# Patient Record
Sex: FEMALE | Race: WHITE | NOT HISPANIC OR LATINO | URBAN - METROPOLITAN AREA
[De-identification: names, ages, dates, MRNs, and addresses within clinical notes are randomized per-mention and may not be internally consistent; named-entity substitution may affect disease eponyms.]

---

## 2017-02-20 ENCOUNTER — AMBULATORY - HEALTHEAST (OUTPATIENT)
Dept: FAMILY MEDICINE | Facility: CLINIC | Age: 62
End: 2017-02-20

## 2017-02-20 ENCOUNTER — COMMUNICATION - HEALTHEAST (OUTPATIENT)
Dept: FAMILY MEDICINE | Facility: CLINIC | Age: 62
End: 2017-02-20

## 2017-02-20 DIAGNOSIS — Z00.00 HEALTH CARE MAINTENANCE: ICD-10-CM

## 2017-02-23 ENCOUNTER — COMMUNICATION - HEALTHEAST (OUTPATIENT)
Dept: FAMILY MEDICINE | Facility: CLINIC | Age: 62
End: 2017-02-23

## 2017-03-14 ENCOUNTER — COMMUNICATION - HEALTHEAST (OUTPATIENT)
Dept: FAMILY MEDICINE | Facility: CLINIC | Age: 62
End: 2017-03-14

## 2017-03-29 ENCOUNTER — COMMUNICATION - HEALTHEAST (OUTPATIENT)
Dept: FAMILY MEDICINE | Facility: CLINIC | Age: 62
End: 2017-03-29

## 2017-07-10 ENCOUNTER — COMMUNICATION - HEALTHEAST (OUTPATIENT)
Dept: FAMILY MEDICINE | Facility: CLINIC | Age: 62
End: 2017-07-10

## 2017-07-10 DIAGNOSIS — R06.00 DYSPNEA: ICD-10-CM

## 2017-09-26 ENCOUNTER — OFFICE VISIT - HEALTHEAST (OUTPATIENT)
Dept: FAMILY MEDICINE | Facility: CLINIC | Age: 62
End: 2017-09-26

## 2017-09-26 DIAGNOSIS — Z00.00 HEALTH CARE MAINTENANCE: ICD-10-CM

## 2017-09-26 DIAGNOSIS — I10 ESSENTIAL HYPERTENSION, BENIGN: ICD-10-CM

## 2017-09-26 DIAGNOSIS — Z12.11 SCREEN FOR COLON CANCER: ICD-10-CM

## 2017-09-26 LAB
CHOLEST SERPL-MCNC: 155 MG/DL
FASTING STATUS PATIENT QL REPORTED: YES
HDLC SERPL-MCNC: 64 MG/DL
LDLC SERPL CALC-MCNC: 76 MG/DL
TRIGL SERPL-MCNC: 75 MG/DL

## 2017-09-26 ASSESSMENT — MIFFLIN-ST. JEOR: SCORE: 1615.91

## 2017-10-20 ENCOUNTER — COMMUNICATION - HEALTHEAST (OUTPATIENT)
Dept: FAMILY MEDICINE | Facility: CLINIC | Age: 62
End: 2017-10-20

## 2018-03-13 ENCOUNTER — COMMUNICATION - HEALTHEAST (OUTPATIENT)
Dept: FAMILY MEDICINE | Facility: CLINIC | Age: 63
End: 2018-03-13

## 2018-03-13 DIAGNOSIS — R06.00 DYSPNEA: ICD-10-CM

## 2018-09-11 ENCOUNTER — COMMUNICATION - HEALTHEAST (OUTPATIENT)
Dept: FAMILY MEDICINE | Facility: CLINIC | Age: 63
End: 2018-09-11

## 2018-09-11 DIAGNOSIS — R06.00 DYSPNEA: ICD-10-CM

## 2018-09-11 DIAGNOSIS — I10 ESSENTIAL HYPERTENSION, BENIGN: ICD-10-CM

## 2019-05-06 ENCOUNTER — COMMUNICATION - HEALTHEAST (OUTPATIENT)
Dept: FAMILY MEDICINE | Facility: CLINIC | Age: 64
End: 2019-05-06

## 2019-05-06 DIAGNOSIS — R06.00 DYSPNEA: ICD-10-CM

## 2019-09-12 ENCOUNTER — COMMUNICATION - HEALTHEAST (OUTPATIENT)
Dept: FAMILY MEDICINE | Facility: CLINIC | Age: 64
End: 2019-09-12

## 2019-09-12 DIAGNOSIS — I10 ESSENTIAL HYPERTENSION, BENIGN: ICD-10-CM

## 2019-09-12 DIAGNOSIS — R06.00 DYSPNEA: ICD-10-CM

## 2020-02-06 ENCOUNTER — COMMUNICATION - HEALTHEAST (OUTPATIENT)
Dept: FAMILY MEDICINE | Facility: CLINIC | Age: 65
End: 2020-02-06

## 2020-02-06 DIAGNOSIS — R06.00 DYSPNEA: ICD-10-CM

## 2020-06-08 ENCOUNTER — COMMUNICATION - HEALTHEAST (OUTPATIENT)
Dept: FAMILY MEDICINE | Facility: CLINIC | Age: 65
End: 2020-06-08

## 2020-06-08 DIAGNOSIS — R06.00 DYSPNEA: ICD-10-CM

## 2020-08-30 ENCOUNTER — OFFICE VISIT - HEALTHEAST (OUTPATIENT)
Dept: FAMILY MEDICINE | Facility: CLINIC | Age: 65
End: 2020-08-30

## 2020-08-30 ENCOUNTER — COMMUNICATION - HEALTHEAST (OUTPATIENT)
Dept: SCHEDULING | Facility: CLINIC | Age: 65
End: 2020-08-30

## 2020-08-30 DIAGNOSIS — H57.12 LEFT EYE PAIN: ICD-10-CM

## 2020-08-30 DIAGNOSIS — R00.0 TACHYCARDIA: ICD-10-CM

## 2020-08-30 DIAGNOSIS — R51.9 LEFT-SIDED HEADACHE: ICD-10-CM

## 2020-08-30 DIAGNOSIS — I10 HYPERTENSION, UNSPECIFIED TYPE: ICD-10-CM

## 2020-09-02 ENCOUNTER — COMMUNICATION - HEALTHEAST (OUTPATIENT)
Dept: CARDIOLOGY | Facility: CLINIC | Age: 65
End: 2020-09-02

## 2020-09-03 ENCOUNTER — OFFICE VISIT - HEALTHEAST (OUTPATIENT)
Dept: CARDIOLOGY | Facility: CLINIC | Age: 65
End: 2020-09-03

## 2020-09-03 DIAGNOSIS — I48.91 NEW ONSET ATRIAL FIBRILLATION (H): ICD-10-CM

## 2020-09-03 DIAGNOSIS — I10 ESSENTIAL HYPERTENSION, BENIGN: ICD-10-CM

## 2020-09-03 DIAGNOSIS — E66.813 CLASS 3 SEVERE OBESITY DUE TO EXCESS CALORIES WITHOUT SERIOUS COMORBIDITY WITH BODY MASS INDEX (BMI) OF 40.0 TO 44.9 IN ADULT (H): ICD-10-CM

## 2020-09-03 DIAGNOSIS — E66.01 CLASS 3 SEVERE OBESITY DUE TO EXCESS CALORIES WITHOUT SERIOUS COMORBIDITY WITH BODY MASS INDEX (BMI) OF 40.0 TO 44.9 IN ADULT (H): ICD-10-CM

## 2020-09-03 DIAGNOSIS — Z72.0 TOBACCO ABUSE: ICD-10-CM

## 2020-09-03 DIAGNOSIS — I48.19 PERSISTENT ATRIAL FIBRILLATION (H): ICD-10-CM

## 2020-09-03 ASSESSMENT — MIFFLIN-ST. JEOR: SCORE: 1586.42

## 2020-09-04 ENCOUNTER — COMMUNICATION - HEALTHEAST (OUTPATIENT)
Dept: CARDIOLOGY | Facility: CLINIC | Age: 65
End: 2020-09-04

## 2020-09-08 ENCOUNTER — OFFICE VISIT (OUTPATIENT)
Dept: NEUROLOGY | Facility: CLINIC | Age: 65
End: 2020-09-08
Payer: MEDICARE

## 2020-09-08 ENCOUNTER — COMMUNICATION - HEALTHEAST (OUTPATIENT)
Dept: CARDIOLOGY | Facility: CLINIC | Age: 65
End: 2020-09-08

## 2020-09-08 VITALS — BODY MASS INDEX: 39.95 KG/M2 | WEIGHT: 234 LBS | HEIGHT: 64 IN

## 2020-09-08 DIAGNOSIS — I48.91 NEW ONSET ATRIAL FIBRILLATION (H): ICD-10-CM

## 2020-09-08 DIAGNOSIS — E66.01 MORBID OBESITY (H): ICD-10-CM

## 2020-09-08 DIAGNOSIS — G43.809 OTHER MIGRAINE WITHOUT STATUS MIGRAINOSUS, NOT INTRACTABLE: Primary | ICD-10-CM

## 2020-09-08 DIAGNOSIS — H53.2 DOUBLE VISION: ICD-10-CM

## 2020-09-08 DIAGNOSIS — R93.89 ABNORMAL MRI: ICD-10-CM

## 2020-09-08 PROCEDURE — 99205 OFFICE O/P NEW HI 60 MIN: CPT | Mod: 95 | Performed by: PSYCHIATRY & NEUROLOGY

## 2020-09-08 RX ORDER — ALBUTEROL SULFATE 90 UG/1
AEROSOL, METERED RESPIRATORY (INHALATION)
COMMUNITY
Start: 2020-06-08 | End: 2021-10-22

## 2020-09-08 RX ORDER — ASPIRIN 81 MG/1
TABLET ORAL
COMMUNITY

## 2020-09-08 RX ORDER — CETIRIZINE HYDROCHLORIDE 10 MG/1
10 TABLET ORAL
COMMUNITY

## 2020-09-08 RX ORDER — LISINOPRIL AND HYDROCHLOROTHIAZIDE 20; 25 MG/1; MG/1
TABLET ORAL
COMMUNITY
Start: 2019-09-13 | End: 2021-10-28

## 2020-09-08 ASSESSMENT — MIFFLIN-ST. JEOR: SCORE: 1591.42

## 2020-09-08 NOTE — NURSING NOTE
Chief Complaint   Patient presents with     Consult     HA- last HERRERA last Tues. Saw Md Guajardo- and with using Apixaban, Metotrololtartrate-Medication has been helpful and decrease in HA's     Computer visit: [email or web address]  [phone] if connection issues.    Brii Palacios, ATC

## 2020-09-08 NOTE — PROGRESS NOTES
"NEUROLOGY OUTPATIENT CONSULT NOTE (VIDEO)  Sep 8, 2020     CHIEF COMPLAINT/REASON FOR VISIT/REASON FOR CONSULT  Patient presents with:  Consult: HA- last HERRERA last Tues. Saw Md Guajardo- and with using Apixaban, Metotrololtartrate-Medication has been helpful and decrease in HA's    REASON FOR CONSULTATION- Headaches    REFERRAL SOURCE  Dr. Guajardo  CC Dr. Guajardo    Video Visit Consent  Patient is being evaluated via a billable video visit. The patient has been notified of following:   \"This video visit will be conducted via a call between you and your physician/provider. We have found that certain health care needs can be provided without the need for an in-person physical exam. This service lets us provide the care you need with a video conversation. If a prescription is necessary we can send it directly to your pharmacy. If lab work is needed we can place an order for that and you can then stop by our lab to have the test done at a later time.  If during the course of the call the physician/provider feels a video visit is not appropriate, you will not be charged for this service.  Physician has received verbal consent for a Video Visit from the patient? YES  Patient would like the video invitation sent by: Email/SMS    Video Visit Details  Type of service: Video Visit  Video Start Time: 10:30  Video End Time (time video stopped): 10:50  Originating Location (pt. Location): Patient's Home  Distant Location (provider location): [facility] Neurology [place]   Mode of Communication: Video Conference via [employer]    HISTORY OF PRESENT ILLNESS  Nicole Hopkins is a 65 year old female seen today for evaluation of headaches.  She reports that she gets headaches sporadically.  They are less than once a month.  On August 18 she started noticing a bump growing in the left temple.  This kept on growing.  On August 30 she had a visit to the ER.  She started having a headache.  Headache came on all of a sudden.  There was no " triggers.  She also had double vision in her left eye.  She is not sure if closing 1 eye made the double vision go away or not.  Her eyeball was sore when she moved it from side to side.  The headache was more of a throbbing headache in the forehead region.  There was some photophobia no phonophobia denies any auras.  Denies any nausea.  She was also found to be in hypertension and atrial fibrillation in the emergency room.  She was treated with apixaban, Lopressor and metoprolol.  Since then she has not had any headaches.  The double vision did not the whole day but then went away.  The lump on her left temple also has resolved since then.  She does have an appointment with dermatology and ENT for follow-up.  She did have an MRI of the brain which showed possible lymphoma versus granulomatous disease.  These were extra cranial.    Patient reports no other neurological symptoms.    Previous history is reviewed and this is unchanged.    PAST MEDICAL/SURGICAL HISTORY  Past Medical History:   Diagnosis Date     Hypertension      Patient Active Problem List   Diagnosis     Morbid obesity (H)   Reviewed and negative other than hypertension and atrial fibrillation    FAMILY HISTORY  Family History   Problem Relation Age of Onset     No Known Problems Mother      No Known Problems Father    No known family history of any neurological problems per the patient.  Reviewed and noncontributory    SOCIAL HISTORY  Social History     Tobacco Use     Smoking status: Current Every Day Smoker     Packs/day: 1.50     Years: 40.00     Pack years: 60.00     Types: Cigarettes     Smokeless tobacco: Never Used   Substance Use Topics     Alcohol use: Yes     Comment: 3-5 cans of beer a day     Drug use: Never       SYSTEMS REVIEW  Twelve-system ROS was done and other than the HPI this was negative.    MEDICATIONS  albuterol (PROAIR HFA/PROVENTIL HFA/VENTOLIN HFA) 108 (90 Base) MCG/ACT inhaler, INHALE 2 PUFFS BY MOUTH EVERY 6 HOURS AS  "NEEDED FOR WHEEZING  apixaban ANTICOAGULANT (ELIQUIS) 5 MG tablet, Take 5 mg by mouth  lisinopril-hydrochlorothiazide (ZESTORETIC) 20-25 MG tablet, TAKE 1 TABLET BY MOUTH ONCE DAILY  aspirin 81 MG EC tablet,   cetirizine (ZYRTEC) 10 MG tablet, Take 10 mg by mouth  miconazole (MICATIN) 100 MG vaginal suppository, Place 100 mg vaginally    No current facility-administered medications on file prior to visit.        PHYSICAL EXAMINATION  VITALS: Ht 1.626 m (5' 4\")   Wt 106.1 kg (234 lb)   BMI 40.17 kg/m    Exam was limited due to video encounter.    Vitals-Unable to do on video  GENERAL -Health appearing, No apparent distress  EYES- No scleral icterus, no eyelid droop, Pupils symmetric  HEENT - Normocephalic, atraumatic, Hearing grossly intact; Oral mucosa moist and pink in color. External Ears and nose intact.   Neck - soft/flexible with normal ROM on visual inspection.  PULM - Good spontaneous respiratory effort;  CV- No edema on visual inspection  MSK- Gait - see Neuro section; Strength and tone- see Neuro section; Range of motion grossly intact.  Psych- Normal mood and affect. Good judgment and insight.     Neurological  Mental status - Patient is awake and oriented. Attention span is normal. Language is fluent and follows commands appropriately.   Cranial nerves - Pupils are symmetric; EOMI, NLF symmetric  Motor - There is no pronator drift. Antigravity in all 4 ext.  Tone - No evidence of rigidity on visual inspection. No tremor.  Reflexes - Unable to do on video  Sensation - Unable to do on Video  Coordination - Finger to nose without dysmetria.   Gait and station - Romberg is negative. Gait is steady        DIAGNOSTICS  MRI  HEAD MRI:   1.  No acute infarction.  2.  Prominence of the nasopharyngeal soft tissues with cystic changes. Nasopharyngeal neoplasm as well as hypertrophy with cystic change/retention cysts are considerations. Correlate with direct inspection.  3.  There is plaque-like, abnormal " subcutaneous soft tissue in the left frontal scalp along the outer cortex of the left frontal bone that demonstrates avid postcontrast enhancement with mild surrounding stranding. Differential considerations include   enhancing granulation tissue if there is a history of prior trauma to this site, as well as neoplasm including primary skin malignancy and lymphoma. This abnormal soft tissue is quite superficial, immediately deep to the skin surface and would be easily   amenable to percutaneous sampling.     HEAD MRA:   1.  Normal MRA [place].     NECK MRA:  1.  Normal neck MRA.    CARDIOLOGY  ECHO pending    RELEVANT LABS  Component      Latest Ref Rng & Units 8/30/2020   WBC      4.0 - 11.0 thou/uL 8.4   RBC      3.80 - 5.40 mill/uL 4.77   Hemoglobin      12.0 - 16.0 g/dL 16.9 (H)   Hematocrit      35.0 - 47.0 % 47.9 (H)   MCV      80 - 100 fL 100   MCH      27.0 - 34.0 pg 35.4 (H)   MCHC      32.0 - 36.0 g/dL 35.3   RDW      11.0 - 14.5 % 12.7   Platelets      140 - 440 thou/uL 283   MPV      8.5 - 12.5 fL 9.7   Neutrophils %      50 - 70 % 77 (H)   Lymphocytes %      20 - 40 % 13 (L)   Monocytes %      2 - 10 % 9   Eosinophils %      0 - 6 % 1   Basophils %      0 - 2 % 1   Immature Granulocyte %      <=0 % 1 (H)   Neutrophils Absolute      2.0 - 7.7 thou/uL 6.5   Lymphocytes Absolute      0.8 - 4.4 thou/uL 1.1   Monocytes Absolute      0.0 - 0.9 thou/uL 0.8   Eosinophils Absolute      0.0 - 0.4 thou/uL 0.1   Basophils Absolute      0.0 - 0.2 thou/uL 0.0   Immature Granulocyte Absolute      <=0.0 thou/uL 0.0   Sodium      136 - 145 mmol/L 133 (L)   Potassium      3.5 - 5.0 mmol/L 3.9   Chloride      98 - 107 mmol/L 98   CO2      22 - 31 mmol/L 23   Anion Gap, Calculation      5 - 18 mmol/L 12   Glucose      70 - 125 mg/dL 115   Calcium      8.5 - 10.5 mg/dL 9.3   BUN      8 - 22 mg/dL 5 (L)   Creatinine      0.60 - 1.10 mg/dL 0.75   GFR MDRD Af Amer      >60 mL/min/1.73m2 >60   GFR MDRD Non Af Amer       >60 mL/min/1.73m2 >60   Troponin I      0.00 - 0.29 ng/mL <0.01   Sed Rate      0 - 20 mm/hr 4   CRP      0.0 - 0.8 mg/dL 1.9 (H)   TSH      0.30 - 5.00 uIU/mL 2.59       OUTSIDE RECORDS  Outside referral notes were reviewed and pertinent information has been summarized in the HPI.  Notes from Dr. Guajardo were reviewed.  In summary, patient presented the ER with double vision in the left eye along with a bad headache.  She was found to be in atrial fibrillation.  Was started on Eliquis as well as metoprolol.  On-call neurologist was consulted.  MRI brain was done which are negative for stroke.  Patient was discharged home.  Patient was supposed to follow-up with neurology, dermatology, ENT    IMPRESSION/REPORT/PLAN  Other migraine without status migrainosus, not intractable  Morbid obesity (H)  Double vision  Abnormal MRI  Atrial fibrillation    This is a 65 year old female with new onset of headache with migrainous features.  Given the MRI being negative I suspect this was a complicated migraine headache.  The double vision that she had is not unusual in people with migraines can get aura of double vision.  MRI is further negative for stroke.  She is already on Eliquis.  MRI was negative for any large vessel stenosis/occlusion.  Headaches have not reoccurred since she was last seen in the ER.  Would hold off on any preventive or abortive medication.  In terms of the lesion on the scalp this is of unclear significance.  Since it is extracranial I would have her follow-up with the ENT and dermatologist to see what the next steps would be.  I do not suspect that this caused her headache.  ESR were further negative for temporal arteritis.  I can see her back on a as needed basis if the headaches flareup.    -Echocardiogram is pending and thrombus needs to be ruled out.     Over 30 minutes were spent coordinating the care for the patient today.  More than 50% of the time was spent counseling, educating the  patient.    Boby Ashford MD  Neurologist  [facility] Neurology [facility]  Tel:- [phone]    This note was dictated using voice recognition software.  Any grammatical or context distortions are unintentional and inherent to the software.

## 2020-09-08 NOTE — LETTER
"    9/8/2020         RE: Nicole Hopkins  [address] [place] [address]        Dear Colleague,    Thank you for referring your patient, Nicole Hopkins, to the [facility] NEUROLOGY [place]. Please see a copy of my visit note below.    NEUROLOGY OUTPATIENT CONSULT NOTE (VIDEO)  Sep 8, 2020     CHIEF COMPLAINT/REASON FOR VISIT/REASON FOR CONSULT  Patient presents with:  Consult: HA- last HERRERA last Tues. Saw Md Guajardo- and with using Apixaban, Metotrololtartrate-Medication has been helpful and decrease in HA's    REASON FOR CONSULTATION- Headaches    REFERRAL SOURCE  Dr. Guajardo  CC Dr. Guajardo    Video Visit Consent  Patient is being evaluated via a billable video visit. The patient has been notified of following:   \"This video visit will be conducted via a call between you and your physician/provider. We have found that certain health care needs can be provided without the need for an in-person physical exam. This service lets us provide the care you need with a video conversation. If a prescription is necessary we can send it directly to your pharmacy. If lab work is needed we can place an order for that and you can then stop by our lab to have the test done at a later time.  If during the course of the call the physician/provider feels a video visit is not appropriate, you will not be charged for this service.  Physician has received verbal consent for a Video Visit from the patient? YES  Patient would like the video invitation sent by: Email/SMS    Video Visit Details  Type of service: Video Visit  Video Start Time: 10:30  Video End Time (time video stopped): 10:50  Originating Location (pt. Location): Patient's Home  Distant Location (provider location): [facility] Neurology [place]   Mode of Communication: Video Conference via [employer]    HISTORY OF PRESENT ILLNESS  Nicole Hopkins is a 65 year old female seen today for evaluation of headaches.  She reports that she gets headaches sporadically.  They are " less than once a month.  On August 18 she started noticing a bump growing in the left temple.  This kept on growing.  On August 30 she had a visit to the ER.  She started having a headache.  Headache came on all of a sudden.  There was no triggers.  She also had double vision in her left eye.  She is not sure if closing 1 eye made the double vision go away or not.  Her eyeball was sore when she moved it from side to side.  The headache was more of a throbbing headache in the forehead region.  There was some photophobia no phonophobia denies any auras.  Denies any nausea.  She was also found to be in hypertension and atrial fibrillation in the emergency room.  She was treated with apixaban, Lopressor and metoprolol.  Since then she has not had any headaches.  The double vision did not the whole day but then went away.  The lump on her left temple also has resolved since then.  She does have an appointment with dermatology and ENT for follow-up.  She did have an MRI of the brain which showed possible lymphoma versus granulomatous disease.  These were extra cranial.    Patient reports no other neurological symptoms.    Previous history is reviewed and this is unchanged.    PAST MEDICAL/SURGICAL HISTORY  Past Medical History:   Diagnosis Date     Hypertension      Patient Active Problem List   Diagnosis     Morbid obesity (H)   Reviewed and negative other than hypertension and atrial fibrillation    FAMILY HISTORY  Family History   Problem Relation Age of Onset     No Known Problems Mother      No Known Problems Father    No known family history of any neurological problems per the patient.  Reviewed and noncontributory    SOCIAL HISTORY  Social History     Tobacco Use     Smoking status: Current Every Day Smoker     Packs/day: 1.50     Years: 40.00     Pack years: 60.00     Types: Cigarettes     Smokeless tobacco: Never Used   Substance Use Topics     Alcohol use: Yes     Comment: 3-5 cans of beer a day     Drug use:  "Never       SYSTEMS REVIEW  Twelve-system ROS was done and other than the HPI this was negative.    MEDICATIONS  albuterol (PROAIR HFA/PROVENTIL HFA/VENTOLIN HFA) 108 (90 Base) MCG/ACT inhaler, INHALE 2 PUFFS BY MOUTH EVERY 6 HOURS AS NEEDED FOR WHEEZING  apixaban ANTICOAGULANT (ELIQUIS) 5 MG tablet, Take 5 mg by mouth  lisinopril-hydrochlorothiazide (ZESTORETIC) 20-25 MG tablet, TAKE 1 TABLET BY MOUTH ONCE DAILY  aspirin 81 MG EC tablet,   cetirizine (ZYRTEC) 10 MG tablet, Take 10 mg by mouth  miconazole (MICATIN) 100 MG vaginal suppository, Place 100 mg vaginally    No current facility-administered medications on file prior to visit.        PHYSICAL EXAMINATION  VITALS: Ht 1.626 m (5' 4\")   Wt 106.1 kg (234 lb)   BMI 40.17 kg/m    Exam was limited due to video encounter.    Vitals-Unable to do on video  GENERAL -Health appearing, No apparent distress  EYES- No scleral icterus, no eyelid droop, Pupils symmetric  HEENT - Normocephalic, atraumatic, Hearing grossly intact; Oral mucosa moist and pink in color. External Ears and nose intact.   Neck - soft/flexible with normal ROM on visual inspection.  PULM - Good spontaneous respiratory effort;  CV- No edema on visual inspection  MSK- Gait - see Neuro section; Strength and tone- see Neuro section; Range of motion grossly intact.  Psych- Normal mood and affect. Good judgment and insight.     Neurological  Mental status - Patient is awake and oriented. Attention span is normal. Language is fluent and follows commands appropriately.   Cranial nerves - Pupils are symmetric; EOMI, NLF symmetric  Motor - There is no pronator drift. Antigravity in all 4 ext.  Tone - No evidence of rigidity on visual inspection. No tremor.  Reflexes - Unable to do on video  Sensation - Unable to do on Video  Coordination - Finger to nose without dysmetria.   Gait and station - Romberg is negative. Gait is steady        DIAGNOSTICS  MRI  HEAD MRI:   1.  No acute infarction.  2.  Prominence of " the nasopharyngeal soft tissues with cystic changes. Nasopharyngeal neoplasm as well as hypertrophy with cystic change/retention cysts are considerations. Correlate with direct inspection.  3.  There is plaque-like, abnormal subcutaneous soft tissue in the left frontal scalp along the outer cortex of the left frontal bone that demonstrates avid postcontrast enhancement with mild surrounding stranding. Differential considerations include   enhancing granulation tissue if there is a history of prior trauma to this site, as well as neoplasm including primary skin malignancy and lymphoma. This abnormal soft tissue is quite superficial, immediately deep to the skin surface and would be easily   amenable to percutaneous sampling.     HEAD MRA:   1.  Normal MRA [place].     NECK MRA:  1.  Normal neck MRA.    CARDIOLOGY  ECHO pending    RELEVANT LABS  Component      Latest Ref Rng & Units 8/30/2020   WBC      4.0 - 11.0 thou/uL 8.4   RBC      3.80 - 5.40 mill/uL 4.77   Hemoglobin      12.0 - 16.0 g/dL 16.9 (H)   Hematocrit      35.0 - 47.0 % 47.9 (H)   MCV      80 - 100 fL 100   MCH      27.0 - 34.0 pg 35.4 (H)   MCHC      32.0 - 36.0 g/dL 35.3   RDW      11.0 - 14.5 % 12.7   Platelets      140 - 440 thou/uL 283   MPV      8.5 - 12.5 fL 9.7   Neutrophils %      50 - 70 % 77 (H)   Lymphocytes %      20 - 40 % 13 (L)   Monocytes %      2 - 10 % 9   Eosinophils %      0 - 6 % 1   Basophils %      0 - 2 % 1   Immature Granulocyte %      <=0 % 1 (H)   Neutrophils Absolute      2.0 - 7.7 thou/uL 6.5   Lymphocytes Absolute      0.8 - 4.4 thou/uL 1.1   Monocytes Absolute      0.0 - 0.9 thou/uL 0.8   Eosinophils Absolute      0.0 - 0.4 thou/uL 0.1   Basophils Absolute      0.0 - 0.2 thou/uL 0.0   Immature Granulocyte Absolute      <=0.0 thou/uL 0.0   Sodium      136 - 145 mmol/L 133 (L)   Potassium      3.5 - 5.0 mmol/L 3.9   Chloride      98 - 107 mmol/L 98   CO2      22 - 31 mmol/L 23   Anion Gap, Calculation      5 - 18  mmol/L 12   Glucose      70 - 125 mg/dL 115   Calcium      8.5 - 10.5 mg/dL 9.3   BUN      8 - 22 mg/dL 5 (L)   Creatinine      0.60 - 1.10 mg/dL 0.75   GFR MDRD Af Amer      >60 mL/min/1.73m2 >60   GFR MDRD Non Af Amer      >60 mL/min/1.73m2 >60   Troponin I      0.00 - 0.29 ng/mL <0.01   Sed Rate      0 - 20 mm/hr 4   CRP      0.0 - 0.8 mg/dL 1.9 (H)   TSH      0.30 - 5.00 uIU/mL 2.59       OUTSIDE RECORDS  Outside referral notes were reviewed and pertinent information has been summarized in the HPI.  Notes from Dr. Guajardo were reviewed.  In summary, patient presented the ER with double vision in the left eye along with a bad headache.  She was found to be in atrial fibrillation.  Was started on Eliquis as well as metoprolol.  On-call neurologist was consulted.  MRI brain was done which are negative for stroke.  Patient was discharged home.  Patient was supposed to follow-up with neurology, dermatology, ENT    IMPRESSION/REPORT/PLAN  Other migraine without status migrainosus, not intractable  Morbid obesity (H)  Double vision  Abnormal MRI  Atrial fibrillation    This is a 65 year old female with new onset of headache with migrainous features.  Given the MRI being negative I suspect this was a complicated migraine headache.  The double vision that she had is not unusual in people with migraines can get aura of double vision.  MRI is further negative for stroke.  She is already on Eliquis.  MRI was negative for any large vessel stenosis/occlusion.  Headaches have not reoccurred since she was last seen in the ER.  Would hold off on any preventive or abortive medication.  In terms of the lesion on the scalp this is of unclear significance.  Since it is extracranial I would have her follow-up with the ENT and dermatologist to see what the next steps would be.  I do not suspect that this caused her headache.  ESR were further negative for temporal arteritis.  I can see her back on a as needed basis if the headaches  flareup.    -Echocardiogram is pending and thrombus needs to be ruled out.     Over 30 minutes were spent coordinating the care for the patient today.  More than 50% of the time was spent counseling, educating the patient.    Boby Ashford MD  Neurologist  [facility] Neurology [facility]  Tel:- [phone]    This note was dictated using voice recognition software.  Any grammatical or context distortions are unintentional and inherent to the software.      Again, thank you for allowing me to participate in the care of your patient.        Sincerely,        Boby Ashford MD

## 2020-09-09 ENCOUNTER — OFFICE VISIT - HEALTHEAST (OUTPATIENT)
Dept: OTOLARYNGOLOGY | Facility: CLINIC | Age: 65
End: 2020-09-09

## 2020-09-09 DIAGNOSIS — J39.2 PHARYNGEAL OR NASOPHARYNGEAL CYST: ICD-10-CM

## 2020-09-10 ENCOUNTER — AMBULATORY - HEALTHEAST (OUTPATIENT)
Dept: FAMILY MEDICINE | Facility: CLINIC | Age: 65
End: 2020-09-10

## 2020-09-11 ENCOUNTER — HOSPITAL ENCOUNTER (OUTPATIENT)
Dept: CARDIOLOGY | Facility: HOSPITAL | Age: 65
Discharge: HOME | End: 2020-09-11
Attending: INTERNAL MEDICINE

## 2020-09-11 DIAGNOSIS — I48.91 NEW ONSET ATRIAL FIBRILLATION (H): ICD-10-CM

## 2020-09-11 LAB
AORTIC ROOT: 3.3 CM
ASCENDING AORTA: 3.1 CM
BSA FOR ECHO PROCEDURE: 2.19 M2
CV BLOOD PRESSURE: ABNORMAL MMHG
CV ECHO HEIGHT: 64 IN
CV ECHO WEIGHT: 234 LBS
DOP CALC LVOT AREA: 2.83 CM2
DOP CALC LVOT DIAMETER: 1.9 CM
DOP CALC LVOT PEAK VEL: 70.9 CM/S
DOP CALC LVOT STROKE VOLUME: 41.4 CM3
DOP CALCLVOT PEAK VEL VTI: 14.6 CM
EJECTION FRACTION: 69 % (ref 55–75)
FRACTIONAL SHORTENING: 21.7 % (ref 28–44)
INTERVENTRICULAR SEPTUM IN END DIASTOLE: 1.17 CM (ref 0.6–0.9)
IVS/PW RATIO: 1
LA AREA 1: 21 CM2
LA AREA 2: 22 CM2
LEFT ATRIUM LENGTH: 5.6 CM
LEFT ATRIUM SIZE: 4.5 CM
LEFT ATRIUM TO AORTIC ROOT RATIO: 1.36 NO UNITS
LEFT ATRIUM VOLUME INDEX: 32 ML/M2
LEFT ATRIUM VOLUME: 70.1 ML
LEFT VENTRICLE DIASTOLIC VOLUME INDEX: 22.4 CM3/M2 (ref 29–61)
LEFT VENTRICLE DIASTOLIC VOLUME: 49.1 CM3 (ref 46–106)
LEFT VENTRICLE HEART RATE: 110 BPM
LEFT VENTRICLE HEART RATE: 81 BPM
LEFT VENTRICLE MASS INDEX: 90.7 G/M2
LEFT VENTRICLE SYSTOLIC VOLUME INDEX: 7 CM3/M2 (ref 8–24)
LEFT VENTRICLE SYSTOLIC VOLUME: 15.3 CM3 (ref 14–42)
LEFT VENTRICULAR INTERNAL DIMENSION IN DIASTOLE: 4.56 CM (ref 3.8–5.2)
LEFT VENTRICULAR INTERNAL DIMENSION IN SYSTOLE: 3.57 CM (ref 2.2–3.5)
LEFT VENTRICULAR MASS: 198.6 G
LEFT VENTRICULAR OUTFLOW TRACT MEAN GRADIENT: 1 MMHG
LEFT VENTRICULAR OUTFLOW TRACT MEAN VELOCITY: 48.6 CM/S
LEFT VENTRICULAR OUTFLOW TRACT PEAK GRADIENT: 2 MMHG
LEFT VENTRICULAR POSTERIOR WALL IN END DIASTOLE: 1.2 CM (ref 0.6–0.9)
LV STROKE VOLUME INDEX: 18.9 ML/M2
MITRAL REGURGITANT VELOCITY TIME INTEGRAL: 162 CM
MITRAL VALVE DECELERATION SLOPE: 8690 MM/S2
MITRAL VALVE PRESSURE HALF-TIME: 41 MS
MR FLOW: 19 CM3
MR MEAN GRADIENT: 71 MMHG
MR MEAN VELOCITY: 405 CM/S
MR PEAK GRADIENT: 95.6 MMHG
MR PISA EROA: 0.2 CM2
MR PISA RADIUS: 0.7 CM
MR PISA VN NYQUIST: 26 CM/S
MV AVERAGE E/E' RATIO: 12.2 CM/S
MV DECELERATION TIME: 139 MS
MV E'TISSUE VEL-LAT: 10.1 CM/S
MV E'TISSUE VEL-MED: 8.22 CM/S
MV LATERAL E/E' RATIO: 11.1
MV MEDIAL E/E' RATIO: 13.6
MV PEAK E VELOCITY: 112 CM/S
MV REGURGITANT VOLUME: 26.5 CC
MV VALVE AREA PRESSURE 1/2 METHOD: 5.4 CM2
NUC REST DIASTOLIC VOLUME INDEX: 3744 LBS
NUC REST SYSTOLIC VOLUME INDEX: 64 IN
PISA MR PEAK VEL: 489 CM/S

## 2020-09-11 ASSESSMENT — MIFFLIN-ST. JEOR: SCORE: 1586.42

## 2020-09-14 ENCOUNTER — OFFICE VISIT - HEALTHEAST (OUTPATIENT)
Dept: FAMILY MEDICINE | Facility: CLINIC | Age: 65
End: 2020-09-14

## 2020-09-14 DIAGNOSIS — I10 ESSENTIAL HYPERTENSION, BENIGN: ICD-10-CM

## 2020-09-14 DIAGNOSIS — Z72.0 TOBACCO ABUSE: ICD-10-CM

## 2020-09-14 DIAGNOSIS — D49.2 SKIN NEOPLASM: ICD-10-CM

## 2020-09-14 DIAGNOSIS — D58.2 ELEVATED HEMOGLOBIN (H): ICD-10-CM

## 2020-09-14 DIAGNOSIS — R73.01 IMPAIRED FASTING GLUCOSE: ICD-10-CM

## 2020-09-14 DIAGNOSIS — J39.2 PHARYNGEAL OR NASOPHARYNGEAL CYST: ICD-10-CM

## 2020-09-14 DIAGNOSIS — R51.9 NONINTRACTABLE EPISODIC HEADACHE, UNSPECIFIED HEADACHE TYPE: ICD-10-CM

## 2020-09-14 DIAGNOSIS — I48.0 PAROXYSMAL ATRIAL FIBRILLATION (H): ICD-10-CM

## 2020-09-14 LAB
ALBUMIN SERPL-MCNC: 3.9 G/DL (ref 3.5–5)
ALP SERPL-CCNC: 77 U/L (ref 45–120)
ALT SERPL W P-5'-P-CCNC: 23 U/L (ref 0–45)
ANION GAP SERPL CALCULATED.3IONS-SCNC: 16 MMOL/L (ref 5–18)
AST SERPL W P-5'-P-CCNC: 22 U/L (ref 0–40)
BILIRUB SERPL-MCNC: 0.7 MG/DL (ref 0–1)
BUN SERPL-MCNC: 9 MG/DL (ref 8–22)
CALCIUM SERPL-MCNC: 9.4 MG/DL (ref 8.5–10.5)
CHLORIDE BLD-SCNC: 97 MMOL/L (ref 98–107)
CHOLEST SERPL-MCNC: 155 MG/DL
CO2 SERPL-SCNC: 20 MMOL/L (ref 22–31)
CREAT SERPL-MCNC: 0.67 MG/DL (ref 0.6–1.1)
ERYTHROCYTE [DISTWIDTH] IN BLOOD BY AUTOMATED COUNT: 11.1 % (ref 11–14.5)
FASTING STATUS PATIENT QL REPORTED: YES
GFR SERPL CREATININE-BSD FRML MDRD: >60 ML/MIN/1.73M2
GLUCOSE BLD-MCNC: 89 MG/DL (ref 70–125)
HBA1C MFR BLD: 5.1 %
HCT VFR BLD AUTO: 49.6 % (ref 35–47)
HDLC SERPL-MCNC: 47 MG/DL
HGB BLD-MCNC: 16.5 G/DL (ref 12–16)
LDLC SERPL CALC-MCNC: 92 MG/DL
MCH RBC QN AUTO: 35.5 PG (ref 27–34)
MCHC RBC AUTO-ENTMCNC: 33.4 G/DL (ref 32–36)
MCV RBC AUTO: 106 FL (ref 80–100)
PLATELET # BLD AUTO: 283 THOU/UL (ref 140–440)
PMV BLD AUTO: 8.6 FL (ref 7–10)
POTASSIUM BLD-SCNC: 3.9 MMOL/L (ref 3.5–5)
PROT SERPL-MCNC: 6.9 G/DL (ref 6–8)
RBC # BLD AUTO: 4.65 MILL/UL (ref 3.8–5.4)
SODIUM SERPL-SCNC: 133 MMOL/L (ref 136–145)
TRIGL SERPL-MCNC: 81 MG/DL
TSH SERPL DL<=0.005 MIU/L-ACNC: 1.67 UIU/ML (ref 0.3–5)
WBC: 9.1 THOU/UL (ref 4–11)

## 2020-09-28 ENCOUNTER — RECORDS - HEALTHEAST (OUTPATIENT)
Dept: ADMINISTRATIVE | Facility: OTHER | Age: 65
End: 2020-09-28

## 2020-10-05 ENCOUNTER — COMMUNICATION - HEALTHEAST (OUTPATIENT)
Dept: FAMILY MEDICINE | Facility: CLINIC | Age: 65
End: 2020-10-05

## 2020-10-05 DIAGNOSIS — I10 ESSENTIAL HYPERTENSION, BENIGN: ICD-10-CM

## 2021-03-08 ENCOUNTER — OFFICE VISIT - HEALTHEAST (OUTPATIENT)
Dept: FAMILY MEDICINE | Facility: CLINIC | Age: 66
End: 2021-03-08

## 2021-03-08 ENCOUNTER — COMMUNICATION - HEALTHEAST (OUTPATIENT)
Dept: FAMILY MEDICINE | Facility: CLINIC | Age: 66
End: 2021-03-08

## 2021-03-08 DIAGNOSIS — I48.0 PAROXYSMAL ATRIAL FIBRILLATION (H): ICD-10-CM

## 2021-03-08 DIAGNOSIS — E87.1 HYPONATREMIA: ICD-10-CM

## 2021-03-08 DIAGNOSIS — Z72.0 TOBACCO ABUSE: ICD-10-CM

## 2021-03-08 DIAGNOSIS — R73.01 IMPAIRED FASTING GLUCOSE: ICD-10-CM

## 2021-03-08 DIAGNOSIS — I10 ESSENTIAL HYPERTENSION, BENIGN: ICD-10-CM

## 2021-03-08 DIAGNOSIS — N30.00 ACUTE CYSTITIS WITHOUT HEMATURIA: ICD-10-CM

## 2021-03-08 DIAGNOSIS — B37.31 YEAST INFECTION OF THE VAGINA: ICD-10-CM

## 2021-03-08 DIAGNOSIS — N95.2 ATROPHIC VAGINITIS: ICD-10-CM

## 2021-03-08 DIAGNOSIS — R30.0 DYSURIA: ICD-10-CM

## 2021-03-08 LAB
ALBUMIN UR-MCNC: NEGATIVE MG/DL
ANION GAP SERPL CALCULATED.3IONS-SCNC: 12 MMOL/L (ref 5–18)
APPEARANCE UR: ABNORMAL
BACTERIA #/AREA URNS HPF: ABNORMAL HPF
BILIRUB UR QL STRIP: NEGATIVE
BUN SERPL-MCNC: 12 MG/DL (ref 8–22)
CALCIUM SERPL-MCNC: 9.2 MG/DL (ref 8.5–10.5)
CHLORIDE BLD-SCNC: 101 MMOL/L (ref 98–107)
CLUE CELLS: NORMAL
CO2 SERPL-SCNC: 23 MMOL/L (ref 22–31)
COLOR UR AUTO: YELLOW
CREAT SERPL-MCNC: 0.76 MG/DL (ref 0.6–1.1)
GFR SERPL CREATININE-BSD FRML MDRD: >60 ML/MIN/1.73M2
GLUCOSE BLD-MCNC: 105 MG/DL (ref 70–125)
GLUCOSE UR STRIP-MCNC: NEGATIVE MG/DL
HBA1C MFR BLD: 5.2 %
HGB UR QL STRIP: ABNORMAL
KETONES UR STRIP-MCNC: NEGATIVE MG/DL
LEUKOCYTE ESTERASE UR QL STRIP: ABNORMAL
NITRATE UR QL: POSITIVE
PH UR STRIP: 7 [PH] (ref 5–8)
POTASSIUM BLD-SCNC: 4.2 MMOL/L (ref 3.5–5)
RBC #/AREA URNS AUTO: ABNORMAL HPF
SODIUM SERPL-SCNC: 136 MMOL/L (ref 136–145)
SP GR UR STRIP: 1.02 (ref 1–1.03)
SQUAMOUS #/AREA URNS AUTO: ABNORMAL LPF
TRICHOMONAS, WET PREP: NORMAL
UROBILINOGEN UR STRIP-ACNC: ABNORMAL
WBC #/AREA URNS AUTO: ABNORMAL HPF
WBC CLUMPS #/AREA URNS HPF: PRESENT /[HPF]
YEAST, WET PREP: NORMAL

## 2021-03-10 LAB — BACTERIA SPEC CULT: ABNORMAL

## 2021-04-27 ENCOUNTER — OFFICE VISIT - HEALTHEAST (OUTPATIENT)
Dept: CARDIOLOGY | Facility: CLINIC | Age: 66
End: 2021-04-27

## 2021-04-27 DIAGNOSIS — I10 ESSENTIAL HYPERTENSION, BENIGN: ICD-10-CM

## 2021-04-27 DIAGNOSIS — I48.19 PERSISTENT ATRIAL FIBRILLATION (H): ICD-10-CM

## 2021-04-27 DIAGNOSIS — E66.01 CLASS 3 SEVERE OBESITY DUE TO EXCESS CALORIES WITHOUT SERIOUS COMORBIDITY WITH BODY MASS INDEX (BMI) OF 40.0 TO 44.9 IN ADULT (H): ICD-10-CM

## 2021-04-27 DIAGNOSIS — Z72.0 TOBACCO ABUSE: ICD-10-CM

## 2021-04-27 DIAGNOSIS — E66.813 CLASS 3 SEVERE OBESITY DUE TO EXCESS CALORIES WITHOUT SERIOUS COMORBIDITY WITH BODY MASS INDEX (BMI) OF 40.0 TO 44.9 IN ADULT (H): ICD-10-CM

## 2021-04-27 DIAGNOSIS — R55 PRE-SYNCOPE: ICD-10-CM

## 2021-04-27 ASSESSMENT — MIFFLIN-ST. JEOR: SCORE: 1695.28

## 2021-05-25 ENCOUNTER — COMMUNICATION - HEALTHEAST (OUTPATIENT)
Dept: FAMILY MEDICINE | Facility: CLINIC | Age: 66
End: 2021-05-25

## 2021-05-25 DIAGNOSIS — R06.2 WHEEZING: ICD-10-CM

## 2021-05-28 NOTE — TELEPHONE ENCOUNTER
RN cannot approve Refill Request    RN can NOT refill this medication PCP messaged that patient is overdue for Office Visit.        Tuyet Macias, [facility] Triage/Med Refill 5/6/2019    Requested Prescriptions   Pending Prescriptions Disp Refills     albuterol (PROAIR HFA;PROVENTIL HFA;VENTOLIN HFA) 90 mcg/actuation inhaler [Pharmacy Med Name: ALBUTEROL HFA 90MCG  AER] 18 each 0     Sig: INHALE 2 PUFFS BY MOUTH EVERY 6 HOURS AS NEEDED FOR WHEEZING       Albuterol/Levalbuterol Refill Protocol Failed - 5/6/2019  5:30 AM        Failed - PCP or prescribing provider visit in last year     Last office visit with prescriber/PCP: 9/26/2017 Jose Salamanca MD OR same dept: Visit date not found OR same specialty: 9/26/2017 Jose Salamanca MD Last physical: 4/6/2015       Next appt within 3 mo: Visit date not found  Next physical within 3 mo: Visit date not found  Prescriber OR PCP: Jose Salamanca MD  Last diagnosis associated with med order: 1. Dyspnea  - albuterol (PROAIR HFA;PROVENTIL HFA;VENTOLIN HFA) 90 mcg/actuation inhaler [Pharmacy Med Name: ALBUTEROL HFA 90MCG  AER]; INHALE 2 PUFFS BY MOUTH EVERY 6 HOURS AS NEEDED FOR WHEEZING  Dispense: 18 each; Refill: 0    If protocol passes may refill for 6 months if within 3 months of last provider visit (or a total of 9 months). If patient requesting >1 inhaler per month refill x 6 months and have patient make appointment with provider.

## 2021-05-31 VITALS — HEIGHT: 63 IN | WEIGHT: 244 LBS | BODY MASS INDEX: 43.23 KG/M2

## 2021-06-01 NOTE — TELEPHONE ENCOUNTER
RN cannot approve Refill Request    RN can NOT refill this medication Protocol failed and NO refill given.         Tuyet Macias, [facility] Triage/Med Refill 9/12/2019    Requested Prescriptions   Pending Prescriptions Disp Refills     albuterol (PROAIR HFA;PROVENTIL HFA;VENTOLIN HFA) 90 mcg/actuation inhaler [Pharmacy Med Name: ALBUTEROL HFA 90MCG (18GM) AER] 18 each 0     Sig: INHALE 2 PUFFS BY MOUTH EVERY 6 HOURS AS NEEDED FOR WHEEZING       Albuterol/Levalbuterol Refill Protocol Failed - 9/12/2019  7:58 AM        Failed - PCP or prescribing provider visit in last year     Last office visit with prescriber/PCP: 9/26/2017 Jose Salamanca MD OR same dept: Visit date not found OR same specialty: 9/26/2017 Jose Salamanca MD Last physical: Visit date not found       Next appt within 3 mo: Visit date not found  Next physical within 3 mo: Visit date not found  Prescriber OR PCP: Jose Salamanca MD  Last diagnosis associated with med order: 1. Dyspnea  - albuterol (PROAIR HFA;PROVENTIL HFA;VENTOLIN HFA) 90 mcg/actuation inhaler [Pharmacy Med Name: ALBUTEROL HFA 90MCG (18GM) AER]; INHALE 2 PUFFS BY MOUTH EVERY 6 HOURS AS NEEDED FOR WHEEZING  Dispense: 18 each; Refill: 0    2. Benign Essential Hypertension  - lisinopril-hydrochlorothiazide (PRINZIDE,ZESTORETIC) 20-25 mg per tablet [Pharmacy Med Name: LISINOPRIL/HCTZ 20-25MG TAB]; TAKE 1 TABLET BY MOUTH ONCE DAILY  Dispense: 90 tablet; Refill: 3    If protocol passes may refill for 6 months if within 3 months of last provider visit (or a total of 9 months). If patient requesting >1 inhaler per month refill x 6 months and have patient make appointment with provider.          lisinopril-hydrochlorothiazide (PRINZIDE,ZESTORETIC) 20-25 mg per tablet [Pharmacy Med Name: LISINOPRIL/HCTZ 20-25MG TAB] 90 tablet 3     Sig: TAKE 1 TABLET BY MOUTH ONCE DAILY       Diuretics/Combination Diuretics Refill Protocol  Failed - 9/12/2019  7:58 AM        Failed - Visit with PCP  or prescribing provider visit in past 12 months     Last office visit with prescriber/PCP: 9/26/2017 Jose Salamanca MD OR same dept: Visit date not found OR same specialty: 9/26/2017 Jose Salamanca MD  Last physical: Visit date not found Last MTM visit: Visit date not found   Next visit within 3 mo: Visit date not found  Next physical within 3 mo: Visit date not found  Prescriber OR PCP: Jose Salamanca MD  Last diagnosis associated with med order: 1. Dyspnea  - albuterol (PROAIR HFA;PROVENTIL HFA;VENTOLIN HFA) 90 mcg/actuation inhaler [Pharmacy Med Name: ALBUTEROL HFA 90MCG (18GM) AER]; INHALE 2 PUFFS BY MOUTH EVERY 6 HOURS AS NEEDED FOR WHEEZING  Dispense: 18 each; Refill: 0    2. Benign Essential Hypertension  - lisinopril-hydrochlorothiazide (PRINZIDE,ZESTORETIC) 20-25 mg per tablet [Pharmacy Med Name: LISINOPRIL/HCTZ 20-25MG TAB]; TAKE 1 TABLET BY MOUTH ONCE DAILY  Dispense: 90 tablet; Refill: 3    If protocol passes may refill for 12 months if within 3 months of last provider visit (or a total of 15 months).             Failed - Serum Potassium in past 12 months      No results found for: LN-POTASSIUM          Failed - Serum Sodium in past 12 months      No results found for: LN-SODIUM          Failed - Blood pressure on file in past 12 months     BP Readings from Last 1 Encounters:   09/26/17 110/84             Failed - Serum Creatinine in past 12 months      Creatinine   Date Value Ref Range Status   09/26/2017 0.67 0.60 - 1.10 mg/dL Final

## 2021-06-02 ENCOUNTER — RECORDS - HEALTHEAST (OUTPATIENT)
Dept: ADMINISTRATIVE | Facility: CLINIC | Age: 66
End: 2021-06-02

## 2021-06-04 VITALS — HEIGHT: 64 IN | WEIGHT: 234 LBS | BODY MASS INDEX: 39.95 KG/M2

## 2021-06-04 VITALS
SYSTOLIC BLOOD PRESSURE: 130 MMHG | RESPIRATION RATE: 16 BRPM | OXYGEN SATURATION: 98 % | BODY MASS INDEX: 39.95 KG/M2 | HEIGHT: 64 IN | DIASTOLIC BLOOD PRESSURE: 78 MMHG | HEART RATE: 106 BPM | WEIGHT: 234 LBS

## 2021-06-04 VITALS
SYSTOLIC BLOOD PRESSURE: 156 MMHG | OXYGEN SATURATION: 96 % | TEMPERATURE: 98.2 F | WEIGHT: 237 LBS | BODY MASS INDEX: 41.98 KG/M2 | RESPIRATION RATE: 16 BRPM | HEART RATE: 129 BPM | DIASTOLIC BLOOD PRESSURE: 99 MMHG

## 2021-06-04 VITALS
OXYGEN SATURATION: 96 % | WEIGHT: 239 LBS | HEART RATE: 64 BPM | SYSTOLIC BLOOD PRESSURE: 136 MMHG | DIASTOLIC BLOOD PRESSURE: 76 MMHG | BODY MASS INDEX: 41.02 KG/M2

## 2021-06-05 VITALS
WEIGHT: 258 LBS | HEART RATE: 67 BPM | BODY MASS INDEX: 44.05 KG/M2 | RESPIRATION RATE: 14 BRPM | SYSTOLIC BLOOD PRESSURE: 148 MMHG | DIASTOLIC BLOOD PRESSURE: 80 MMHG | OXYGEN SATURATION: 98 % | HEIGHT: 64 IN

## 2021-06-05 VITALS
BODY MASS INDEX: 41.97 KG/M2 | WEIGHT: 244.5 LBS | HEART RATE: 90 BPM | SYSTOLIC BLOOD PRESSURE: 130 MMHG | OXYGEN SATURATION: 98 % | DIASTOLIC BLOOD PRESSURE: 76 MMHG

## 2021-06-05 NOTE — TELEPHONE ENCOUNTER
RN cannot approve Refill Request    RN can NOT refill this medication Protocol failed and NO refill given.      Tuyet Macias, [facility] Triage/Med Refill 2/6/2020    Requested Prescriptions   Pending Prescriptions Disp Refills     albuterol (PROAIR HFA;PROVENTIL HFA;VENTOLIN HFA) 90 mcg/actuation inhaler [Pharmacy Med Name: Albuterol Sulfate  (90 Base) MCG/ACT Inhalation Aerosol Solution] 18 g 0     Sig: INHALE 2 PUFFS BY MOUTH EVERY 6 HOURS AS NEEDED FOR WHEEZING       Albuterol/Levalbuterol Refill Protocol Failed - 2/6/2020  8:30 AM        Failed - PCP or prescribing provider visit in last year     Last office visit with prescriber/PCP: 9/26/2017 Jose Salamanca MD OR same dept: Visit date not found OR same specialty: 9/26/2017 Jose Salamanca MD Last physical: Visit date not found       Next appt within 3 mo: Visit date not found  Next physical within 3 mo: Visit date not found  Prescriber OR PCP: Jose Salamanca MD  Last diagnosis associated with med order: 1. Dyspnea  - albuterol (PROAIR HFA;PROVENTIL HFA;VENTOLIN HFA) 90 mcg/actuation inhaler [Pharmacy Med Name: Albuterol Sulfate  (90 Base) MCG/ACT Inhalation Aerosol Solution]; INHALE 2 PUFFS BY MOUTH EVERY 6 HOURS AS NEEDED FOR WHEEZING  Dispense: 18 g; Refill: 0    If protocol passes may refill for 6 months if within 3 months of last provider visit (or a total of 9 months). If patient requesting >1 inhaler per month refill x 6 months and have patient make appointment with provider.

## 2021-06-11 NOTE — PROGRESS NOTES
Patient ID: Nicole Hopkins is a 65 y.o. female.  /76   Pulse 64   Wt (!) 239 lb (108.4 kg)   SpO2 96%   BMI 41.02 kg/m      Assessment/Plan:                   Diagnoses and all orders for this visit:    Benign Essential Hypertension  -     Comprehensive Metabolic Panel  -     Lipid Cascade    Impaired fasting glucose  -     Comprehensive Metabolic Panel  -     Glycosylated Hemoglobin A1c    Tobacco abuse    Paroxysmal atrial fibrillation (H)  -     Thyroid Cascade    Pharyngeal or nasopharyngeal cyst    Skin neoplasm    Nonintractable episodic headache, unspecified headache type    Elevated hemoglobin (H)  -     HM2(CBC w/o Differential)    Other orders  -     Cancel: Influenza,Quad,High Dose,PF 65 YR+  -     Pneumococcal polysaccharide vaccine 23-valent 1 yo or older, subq/IM        DISCUSSION  Repeat labs for considerations as listed above.  Pneumococcal vaccine and Cologuard testing.  Follow-up with me in 3 to 6 months to reevaluate pending results of lab tests from today.  Subjective:     HPI    Nicole Hopkins is a 65 y.o. female we have seen for a long time but has not had a recent follow-up visit in the past 2 years is here today to follow-up on a recent emergency department visit for multiple issues that were identified.    Medical history is significant for body mass index greater than 40, hypertension, tobacco use and alcohol use.    She is not up-to-date with all routine health screening.    She presented to the emergency department on August 30 after suffering from intermittent frontal headaches that were relatively mild for several weeks leading up to that timeframe.  She developed a strange skin lesion or a bump that she calls it in the area above her left eye on the forehead.  This combined with worsening headache prompted a visit to urgent care where she was sent over to the emergency room for further evaluation.  She was found to be in atrial fibrillation.  With her headache she complained  of some visual changes.  An MRI scan did not show any sign of stroke.  MRI showed enhancement of the previously described skin lesion which created concern for potential skin malignancy.  She was also found to have nasopharyngeal cystic structures.    Specialists were consulted in the emergency room including cardiology, neurology.  She was started on Eliquis and metoprolol.  There was not thought to be any concern for an acute neurologic event to warrant hospitalization.  She was referred outpatient to dermatology and ENT.  She saw the ENT provider who stated that the nasopharyngeal cysts were benign and did not require further follow-up unless they became symptomatic.  Her dermatology appointment is pending later this week but the skin concerns seems to have nearly resolved.  She has been seen outpatient by cardiology who recommended continued use of Eliquis and metoprolol, an outpatient dose adjustment was made.  Patient does not report any signs or symptoms of rapid ventricular response.  An echocardiogram was obtained and is normal.  3-month follow-up with cardiology was recommended.  She was seen via a virtual visit with neurology who felt that she likely had a migrainous type headache with accompanying visual changes as the cause for her headache.  They did not recommend medication treatment given this 1 episode but had invited follow-up should further concerns arise.    Patient does continue to smoke but is working toward complete smoking cessation.  Patient drinks 3-4 beers daily the effects of alcohol including the likely effect on atrial fibrillation and perhaps some of the other concerns discussed was mention today.  We discussed that 1-2 alcoholic beverages per day on average would be the most that would be recommended.  She will actively work to try and cut back.  She does not have any signs or symptoms to suggest underlying sleep apnea.    Discussed the benefits of continued efforts at weight  loss.    Reviewed routine health screening including benefits of updating vaccines, she is agreeable to Pneumovax vaccine today but declines influenza vaccine and updated tetanus shot.  Declines mammography but states she may consider in the future.  Declines colonoscopy but is willing to consider Cologuard testing.    Review of Systems  Complete review of systems is obtained.  Other than the specific considerations noted above complete review of systems is negative.          Objective:   Medications:  Current Outpatient Medications   Medication Sig     apixaban ANTICOAGULANT (ELIQUIS) 5 mg Tab tablet Take 1 tablet (5 mg total) by mouth 2 (two) times a day.     aspirin 81 MG EC tablet      cetirizine (ZYRTEC) 10 MG tablet Take 10 mg by mouth daily.     lisinopril-hydrochlorothiazide (PRINZIDE,ZESTORETIC) 20-25 mg per tablet TAKE 1 TABLET BY MOUTH ONCE DAILY     metoprolol tartrate (LOPRESSOR) 50 MG tablet Take 1 tablet (50 mg total) by mouth 2 (two) times a day.     triamcinolone (KENALOG) 0.1 % cream Apply twice daily as needed       Allergies:  Allergies   Allergen Reactions     Penicillins        Tobacco:   reports that she has been smoking. She has been smoking about 1.00 pack per day. She has never used smokeless tobacco.     Physical Exam          /76   Pulse 64   Wt (!) 239 lb (108.4 kg)   SpO2 96%   BMI 41.02 kg/m            General Appearance:    Alert, cooperative, no distress   Eyes:   No scleral icterus or conjunctival irritation       Lungs:     Clear to auscultation bilaterally, respirations unlabored, no wheezes or crackles   Heart:   Irregularly irregular with heart rate that this seems to be in the upper 80 range no heart murmur.   Extremities:  No edema, no joint swelling or redness, no evidence of any injuries   Skin:  No concerning skin findings, no suspicious moles, no rashes   Neurologic:  On gross examination there is no motor or sensory deficit.  Patient walks with a normal gait

## 2021-06-11 NOTE — TELEPHONE ENCOUNTER
PC to patient and review of OV with ELIDA 9/3/20. Metoprolol was increased to better control HR. Yes, continue lisinopril/hctz also. Discussion of how each medicaiton helps her. Verbalized understanding. Call if questions or concerns. Pt reports she will continue meds as prescribed. CMM,Rn

## 2021-06-11 NOTE — PROGRESS NOTES
Chief Complaint   Patient presents with     Headache     x3 days     Mass     since 08/19/2020 lump on forehead     Eye Pain     left eye pain x3 days       HPI:  Nicole Hopkins is a 65 y.o. female who presents today complaining of headache and left eye pain x 3 days. Patient also reports a lump on her left temple and left lateral anterior forehead since 8/19/20. NKI.  Patient denies any eye discharge, but has had some eye tearing.  She denies any peripheral vision loss, but reports diplopia that is horizontal in nature.  Diplopia is transient.  She reports eye redness and some photophobia.  Her headache is across the whole forehead.  She denies any fever, chills, nasal congestion, sinus congestion, cough, or shortness of breath.  She denies any GI upset.  She denies any history of migraine disorder cluster headaches.  Patient states that  early this morning her head pain was 10 out of 10 and she almost went to the emergency department.  It seems to improve for little while so she held off on seeking medical attention.    History obtained from the patient.    Problem List:  Benign Essential Hypertension  Impaired Fasting Glucose      No past medical history on file.    Social History     Tobacco Use     Smoking status: Current Every Day Smoker     Packs/day: 1.00     Smokeless tobacco: Never Used   Substance Use Topics     Alcohol use: Not on file       Review of Systems   Constitutional: Negative for chills and fever.   HENT: Negative for congestion and sinus pressure.    Eyes: Positive for photophobia, pain, redness and visual disturbance. Negative for discharge (watering) and itching.   Respiratory: Negative for cough and shortness of breath.    Gastrointestinal: Negative for abdominal pain, diarrhea, nausea and vomiting.   Musculoskeletal:        (+) swelling on left lateral forehead   Skin: Negative for rash.   Neurological: Positive for headaches.       Vitals:    08/30/20 1124 08/30/20 1125   BP: (!) 162/108  (!) 156/99   Patient Site: Left Arm Right Arm   Patient Position: Sitting Sitting   Cuff Size: Adult Large Adult Large   Pulse: (!) 118 (!) 129   Resp: 16    Temp: 98.2  F (36.8  C)    TempSrc: Oral    SpO2: 96%    Weight: (!) 237 lb (107.5 kg)        Physical Exam  Vitals signs and nursing note reviewed.   Constitutional:       General: She is not in acute distress.     Appearance: She is well-developed. She is not diaphoretic.   HENT:      Head: Normocephalic and atraumatic.      Comments: Nontender to palpation over the temporal artery.  There is slight protrusion over the left side of the forehead.  No redness noted over the skin     Right Ear: External ear normal.      Left Ear: External ear normal.   Eyes:      General: Lids are normal.         Right eye: No discharge.         Left eye: No discharge.      Conjunctiva/sclera:      Right eye: Right conjunctiva is not injected.      Left eye: Left conjunctiva is injected.      Pupils:      Right eye: Pupil is round, reactive and not sluggish.      Left eye: Pupil is sluggish.   Cardiovascular:      Rate and Rhythm: Normal rate and regular rhythm.      Heart sounds: Normal heart sounds.   Pulmonary:      Effort: Pulmonary effort is normal. No respiratory distress.      Breath sounds: Normal breath sounds.   Skin:     Findings: No rash.   Neurological:      Mental Status: She is alert.   Psychiatric:         Behavior: Behavior normal.         Thought Content: Thought content normal.         Judgment: Judgment normal.          Hearing Screening    125Hz 250Hz 500Hz 1000Hz 2000Hz 3000Hz 4000Hz 6000Hz 8000Hz   Right ear:            Left ear:               Visual Acuity Screening    Right eye Left eye Both eyes   Without correction:      With correction: 10/16 10/16 10/12.5       Clinical Decision Making:  Differential diagnosis includes but is not limited to temporal  arteritis, glaucoma, cluster headache, migraine headache, iritis, shingles, intracranial mass, or   conjunctivitis.  Given the vastus and seriousness of the patient's differential diagnosis I recommend she be seen in the emergency department.  Patient is agreeable to this plan.  Patient was transported via private vehicle by her son.  Report given to provider at [facility] emergency department prior to the patient's arrival.  At the end of the encounter, I discussed results, diagnosis, medications. Discussed red flags for immediate return to clinic/ER, as well as indications for follow up if no improvement. Patient understood and agreed to plan. Patient was stable for discharge.    1. Left-sided headache     2. Tachycardia     3. Hypertension, unspecified type     4. Left eye pain           Patient Instructions   Complete evaluation at [facility] ED.

## 2021-06-11 NOTE — TELEPHONE ENCOUNTER
Wellness Screening Tool  Symptom Screening:  Do you have one of the following NEW symptoms:    Fever (subjective or >100.0)?  No    A new cough?  No    Shortness of breath?  No     Chills? No     New loss of taste or smell? No     Generalized body aches? No     New persistent headache? No     New sore throat? No     Nausea, vomiting, or diarrhea?  No    Within the past 2 weeks, have you been exposed to someone with a known positive illness below:    COVID-19 (known or suspected)?  No    Chicken pox?  No    Mealses?  No    Pertussis?  No    Patient notified of visitor policy- They may have one person accompany them to their appointment, but they will need to wear a mask and will be screened upon arrival for symptoms: Yes  Pt informed to wear a mask: Yes  Pt notified if they develop any symptoms listed above, prior to their appointment, they are to call the clinic directly at [phone] for further instructions.  Yes  Patient's appointment status: Patient will be seen in clinic as scheduled on 9/3/20

## 2021-06-11 NOTE — PROGRESS NOTES
HISTORY OF PRESENT ILLNESS  Asked to see by Dr. Guajardo for evaluation of the patient's nasopharynx. Patient isn't quite sure why she was referred. She understands that there was an abnormal MRI that showed something abnormal behind the nose. She reports no change in breathing, eating, swallowing or speaking. Her voice is normal. I reviewed the MRI report and images and there is suggestion of a nasopharyngeal cyst. The radiologist recommended direct visualization of the area.     REVIEW OF SYSTEMS  Review of Systems: a 10-system review was performed. Pertinent positives are noted in the HPI and on a separate scanned document in the chart.    PMH, PSH, FH and SH has documented in the EHR.      EXAM    CONSTITUTIONAL  General Appearance:  Normal, well developed, well nourished, no obvious distress  Ability to Communicate:  communicates appropriately.    HEAD AND FACE  Appearance and Symmetry:  Normal, no scalp or facial scarring or suspicious lesions.  Paranasal sinuses tenderness:  Normal, Paranasal sinuses non tender    EARS  Clinical speech reception threshold:  Normal, able to hear normal speech.  Auricle:  Normal, Auricles without scars, lesions, masses.  External auditory canal:  Normal, External auditory canal normal.  Tympanic membrane:  Normal, Tympanic membranes normal without swelling or erythema.  Tympanic membrane mobility:  Normal, Normal tympanic membrane mobility.    NOSE (speculum or scope)  Architecture:  Normal, Grossly normal external nasal architecture with no masses or lesions.  Mucosa:  Normal mucosa, No polyps or masses.  Septum:  Normal, Septum non-obstructing.  Turbinates:  Normal, No turbinate abnormalities    NASOPHARYNX  Appears to be a centrally located apparently benign cysts slightly darkened cyst. No abnormal growth or mass    ORAL CAVITY AND OROPHARYNX  Lips:  Normal.  Dental and gingiva:  Normal, No obvious dental or gingival disease.  Mucosa:  Normal, Moist mucous membranes.  Tongue:   Normal, Tongue mobile with no mucosal abnormalities  Hard and soft palate:  Normal, Hard and soft palate without cleft or mucosal lesions.  Oral pharynx:  Normal, Posterior pharynx without lesions or remarkable asymmetry.  Saliva:  Normal, Clear saliva.  Masses:  Normal, No palpable masses or pathologically enlarged lymph nodes.    LARYNX AND HYPOPHARYNX ( scope)  Voice Quality:  Normal voice quality.  Supra glottis:  Normal, No edema or erythema.  Epiglottis:  Normal, No epiglottic mass or mucosal lesions.  Pyriform sinuses:  Normal, Pyriform sinuses clear.  Vocal cords appearance:  Normal, Vocal cord motion symmetric.  Vocal cord motion:  Normal, Vocal cord motion symmetric  Endolarynx:  Normal, No Endolaryngeal mass or mucosal lesions.    NECK  Masses/lymph nodes:  Normal, No worrisome neck masses or lymph nodes.  Salivary glands:  Normal, Parotid and submandibular glands.  Trachea and larynx position:  Normal, Trachea and larynx midline.  Thyroid:  Normal, No thyroid abnormality.  Tenderness:  Normal, No cervical tenderness.  Suppleness:  Normal, Neck supple    NEUROLOGICAL  Speech pattern:  Normal, Proasaic    RESPIRATORY  Symmetry and Respiratory effort:  Normal, Symmetric chest movement and expansion with no increased intercostal retractions or use of accessory muscles.     MRI HEAD  Scan images reviewed.     IMPRESSION  Nasopharyngeal cyst.    RECOMMENDATION  Follow up as needed. I discussed the need for follow up with any significant change in breathing, discomfort or swallowing difficulty.    Richard Church MD

## 2021-06-11 NOTE — TELEPHONE ENCOUNTER
"  ===View-only below this line===  ----- Message -----  From: AjeuniceIsabellelavonne  Sent: 9/8/2020   8:30 AM CDT  To: Malcom Maravilla RN    Patient has already contacted their pharmacy. The medication or refill issue is below:    Primary Cardiologist: Juan Carlos Corrigan  Medication: Metroprolol and Eloquis  Issue / Concern: Per patient, Dr. Corrigan increased her Metroprolol dose to 50mg and was supposed to send new RX to pharmacy but pharmacy has not received new RX, pt also needs Eloquis refilled  Preferred Pharmacy/City: [facility] pharmacy in [facility] Phone Number for Patient: [phone]    Additional Info: patient will not be available for call back between 10:30-11:30am     Provider had ordered, but under \"no print\". New Rx sent to pharmacy on file. CMM,Rn   "

## 2021-06-11 NOTE — TELEPHONE ENCOUNTER
Pt states on 8/19/20 left temple was a little sore, after that the next day pt had swelling above temple with a little headache.  On 8/27/20 left eye is sore and pt has headache for the past 3 days.  Left eye tearing and light sensitive and continues with swelling above left temple.  No fever.  Pt intends to go to [facility] today.  Saloni Lopez RN, MA  [facility]    Triage Nurse Advisor    Additional Information    Negative: Eye exposure to chemical or fumes    Negative: Redness of white of eye (sclera), but no pus or only a small amount of brief pus    MODERATE eye pain (e.g., interferes with normal activities)    Protocols used: EYE - PUS OR [id number]

## 2021-06-11 NOTE — TELEPHONE ENCOUNTER
----- Message from Sabine Jones sent at 9/4/2020 11:11 AM CDT -----  Regarding: ELIDA PT / DISCUSS MEDICATION  General phone call:    Caller: Nicole Couch     Primary cardiologist: ELIDA     Detailed reason for call: Pt is requesting a call back to discuss if she should still be taking her lisinopril-hydrochlorothiazide medication along with her new meds that ELIDA prescribed to her.     Best phone number: [phone]    Best time to contact: Anytime     Ok to leave a detailed message? Yes     Device? No     Additional Info:

## 2021-06-12 NOTE — TELEPHONE ENCOUNTER
Refill Approved    Rx renewed per Medication Renewal Policy. Medication was last renewed on 9/13/19.    Tuyet Maicas, [facility] Triage/Med Refill 10/7/2020     Requested Prescriptions   Pending Prescriptions Disp Refills     lisinopriL-hydrochlorothiazide (PRINZIDE,ZESTORETIC) 20-25 mg per tablet [Pharmacy Med Name: Lisinopril-hydroCHLOROthiazide 20-25 MG Oral Tablet] 90 tablet 0     Sig: Take 1 tablet by mouth once daily       Diuretics/Combination Diuretics Refill Protocol  Passed - 10/5/2020  8:21 AM        Passed - Visit with PCP or prescribing provider visit in past 12 months     Last office visit with prescriber/PCP: 9/14/2020 Jose Salamanca MD OR same dept: 9/14/2020 Jose Salamanca MD OR same specialty: 9/14/2020 Jose Salamanca MD  Last physical: Visit date not found Last MTM visit: Visit date not found   Next visit within 3 mo: Visit date not found  Next physical within 3 mo: Visit date not found  Prescriber OR PCP: Jose Salmaanca MD  Last diagnosis associated with med order: 1. Benign Essential Hypertension  - lisinopriL-hydrochlorothiazide (PRINZIDE,ZESTORETIC) 20-25 mg per tablet [Pharmacy Med Name: Lisinopril-hydroCHLOROthiazide 20-25 MG Oral Tablet]; TAKE 1 TABLET BY MOUTH ONCE DAILY  Dispense: 90 tablet; Refill: 0    If protocol passes may refill for 12 months if within 3 months of last provider visit (or a total of 15 months).             Passed - Serum Potassium in past 12 months      Lab Results   Component Value Date    Potassium 3.9 09/14/2020             Passed - Serum Sodium in past 12 months      Lab Results   Component Value Date    Sodium 133 (L) 09/14/2020             Passed - Blood pressure on file in past 12 months     BP Readings from Last 1 Encounters:   09/14/20 136/76             Passed - Serum Creatinine in past 12 months      Creatinine   Date Value Ref Range Status   09/14/2020 0.67 0.60 - 1.10 mg/dL Final

## 2021-06-13 NOTE — PROGRESS NOTES
" Patient ID: Nicole Hopkins is a 62 y.o. female.  /84  Pulse 87  Ht 5' 3\" (1.6 m)  Wt (!) 244 lb (110.7 kg)  SpO2 96%  BMI 43.22 kg/m2    Assessment/Plan:                Diagnoses and all orders for this visit:    Benign Essential Hypertension  -     lisinopril-hydrochlorothiazide (PRINZIDE,ZESTORETIC) 20-25 mg per tablet; TAKE ONE TABLET BY MOUTH ONCE DAILY  Dispense: 90 tablet; Refill: 3  -     Basic Metabolic Panel  -     Lipid Cascade FASTING    Screen for colon cancer  -     Ambulatory referral for Colonoscopy    Health care maintenance  -     Mammo Screening Bilateral; Future; Expected date: 9/26/17    DISCUSSION  Obtain labs as above.  Pneumonia vaccine.  Arrange for mammography and colon cancer screening with [facility]  Subjective:     HPI    Nicole Hopkins is a 62-year-old female whose medical history significant for hypertension and chronic smoking.  She does not have full-blown COPD but is at high risk.  Is noticing that she has more significant respiratory infections.  The importance of smoking cessation is discussed extensively.  We also discussed lung cancer screening as she fits into this.  She will contemplate the idea of lung cancer screening further.  She does inform me she is working to try and cut back and ultimately quit smoking altogether.    She is due for mammography and colon cancer screening.  We discussed getting the set up today.    She has hypertension her blood pressure is controlled.  She is due for laboratory testing.  Cholesterol has generally been reasonable.  She would benefit from a pneumonia vaccine given her extensive smoking history.    Is no other symptomatic concerns or complaints today.    Review of Systems  Complete review of systems is obtained.  Other than the specific considerations noted above complete review of systems is negative.      Objective:   Medications:  Current Outpatient Prescriptions   Medication Sig     aspirin 81 MG EC tablet      " "lisinopril-hydrochlorothiazide (PRINZIDE,ZESTORETIC) 20-25 mg per tablet TAKE ONE TABLET BY MOUTH ONCE DAILY     terconazole (TERAZOL 3) 0.8 % vaginal cream Insert one applicatorful vaginally for 3 nights     triamcinolone (KENALOG) 0.1 % cream Apply twice daily as needed     VENTOLIN HFA 90 mcg/actuation inhaler INHALE TWO PUFFS BY MOUTH EVERY 6 HOURS AS NEEDED FOR WHEEZING     cetirizine (ZYRTEC) 10 MG tablet Take 10 mg by mouth daily.     fluconazole (DIFLUCAN) 150 MG tablet Take one tablet by mouth today, then another tablet in 7 days.     fluconazole (DIFLUCAN) 150 MG tablet 1 tablet daily every 3 days for 3 doses, then once weekly for 2 months     Allergies:  Allergies   Allergen Reactions     Penicillins      Tobacco:   reports that she has been smoking.  She has been smoking about 1.00 pack per day. She has never used smokeless tobacco.     Physical Exam      /84  Pulse 87  Ht 5' 3\" (1.6 m)  Wt (!) 244 lb (110.7 kg)  SpO2 96%  BMI 43.22 kg/m2      General Appearance:    Alert, cooperative, no distress   Eyes:    No conjunctival irritation, no scleral icterus       Ears:    Normal TM's and external ear canals, both ears   Throat:   Lips, mucosa, and tongue normal; teeth and gums normal   Neck:   Supple, symmetrical, trachea midline, no adenopathy;        thyroid:  No enlargement/tenderness/nodules   Lungs:     Clear to auscultation bilaterally, respirations unlabored   Heart:    Regular rate and rhythm, S1 and S2 normal, no murmur, rub   or gallop   Extremities:   Extremities normal, atraumatic, no cyanosis or edema   Skin:   Skin color, texture, turgor normal, no rashes or lesions   Neurologic:   Normal strength and sensation                          "

## 2021-06-15 NOTE — PROGRESS NOTES
Patient ID: Nicole Hopkins is a 66 y.o. female.  /76   Pulse 90   Wt (!) 244 lb 8 oz (110.9 kg)   SpO2 98%   BMI 41.97 kg/m      Assessment/Plan:                   Diagnoses and all orders for this visit:    Dysuria  -     Urinalysis-UC if Indicated  -     Culture, Urine  -     sulfamethoxazole-trimethoprim (BACTRIM DS) 800-160 mg per tablet; Take 1 tablet by mouth 2 (two) times a day for 7 days.  Dispense: 14 tablet; Refill: 0    Yeast infection of the vagina  -     Glycosylated Hemoglobin A1c  -     clotrimazole 2 % Crea; Insert 1 applicator into the vagina daily.  Dispense: 21 g; Refill: 4  -     Wet Prep, Vaginal  -     fluconazole (DIFLUCAN) 150 MG tablet; Take 1 tablet (150 mg total) by mouth once for 1 dose. Repeat by taking 1 tablet by mouth once in 7 days if needed  Dispense: 2 tablet; Refill: 0    Atrophic vaginitis  -     estradioL (ESTRACE) 0.01 % (0.1 mg/gram) vaginal cream; Insert 1 g into the vagina 3 (three) times a week.  Dispense: 42.5 g; Refill: 1    Acute cystitis without hematuria    Benign Essential Hypertension  -     Basic Metabolic Panel    Tobacco abuse    Impaired fasting glucose  -     Glycosylated Hemoglobin A1c    Paroxysmal atrial fibrillation (H)    Hyponatremia  -     Basic Metabolic Panel    Other orders  -     Cancel: Comprehensive Metabolic Panel  -     Cancel: Glycosylated Hemoglobin A1c  -     Cancel: HM2(CBC w/o Differential)           DISCUSSION  On exam atrophic vaginitis is the most likely consideration for her chronic symptoms.  Discussed this considerably.  No yeast was found on exam today.  There is a high probability she has a tendency toward getting yeast vaginitis.  She does have urinary findings which are consistent with acute UTI based on the clinical description and the urine findings.  We will treat her with Bactrim for 7 days.  This is an uncomplicated UTI.  Given her propensity for yeast vaginitis will cover her with Diflucan 1 dose which can be  repeated in 7 days if needed.  For the atrophic vaginitis which may help reduce frequency of urine infections as well as vaginal irritation will initiate estradiol cream 3 times weekly.  Discussed potential risks associated with estrogen therapy but feel the risk is reasonable based on the topical application and the frequency of use.    Check other labs as noted for conditions otherwise discussed below.    Prescription sent for Chlortrimazole patient request to be used if needed in the future.  Subjective:     HPI    Nicole Hopkins is a 66 y.o. female her medical history includes hypertension, atrial fibrillation, chronic vaginitis (possibly yeast vaginitis) concerned with white vaginal discharge and chronic irritation with dysuria more recently.  Patient states she tends to use Chlortrimazole cream intermittently to treat what is thought to be more of a chronic yeast vaginitis..    She is here today her last specific evaluation for this occurred back in 2015.  Patient denies vaginal bleeding.  She is postmenopausal.  Patient does not endorse urinary frequency.    She was diagnosed back in September 2020 with atrial fibrillation started on anticoagulation and medication for rate control.  Blood pressure is reasonable.  She does not report any symptoms associated with atrial fibrillation.  Reports no concerns related to blood thinner.  She has cut back smoking considerably and is smoking 1 to 1-1/2 cigarettes/day.  She is encouraged to continue to work to completely cut out smoking.  She did have a history of elevated fasting blood sugar.  An A1c obtained back in September was 5.1 which is quite favorable.  She did have mild hyponatremia.  We discussed recheck of laboratory tests today.    Review of Systems  Complete review of systems is obtained.  Other than the specific considerations noted above complete review of systems is negative.          Objective:   Medications:  Current Outpatient Medications   Medication  Sig     apixaban ANTICOAGULANT (ELIQUIS) 5 mg Tab tablet Take 1 tablet (5 mg total) by mouth 2 (two) times a day.     aspirin 81 MG EC tablet      cetirizine (ZYRTEC) 10 MG tablet Take 10 mg by mouth daily.     lisinopriL-hydrochlorothiazide (PRINZIDE,ZESTORETIC) 20-25 mg per tablet Take 1 tablet by mouth once daily     metoprolol tartrate (LOPRESSOR) 50 MG tablet Take 1 tablet (50 mg total) by mouth 2 (two) times a day.     triamcinolone (KENALOG) 0.1 % cream Apply twice daily as needed     clotrimazole 2 % Crea Insert 1 applicator into the vagina daily.     estradioL (ESTRACE) 0.01 % (0.1 mg/gram) vaginal cream Insert 1 g into the vagina 3 (three) times a week.     fluconazole (DIFLUCAN) 150 MG tablet Take 1 tablet (150 mg total) by mouth once for 1 dose. Repeat by taking 1 tablet by mouth once in 7 days if needed     sulfamethoxazole-trimethoprim (BACTRIM DS) 800-160 mg per tablet Take 1 tablet by mouth 2 (two) times a day for 7 days.       Allergies:  Allergies   Allergen Reactions     Penicillins        Tobacco:   reports that she has been smoking. She has been smoking about 1.00 pack per day. She has never used smokeless tobacco.     Physical Exam          /76   Pulse 90   Wt (!) 244 lb 8 oz (110.9 kg)   SpO2 98%   BMI 41.97 kg/m        General: Patient alert no signs of distress    : No significant external vaginal irritation.  Vaginal mucosa is otherwise atrophic with a dry chronically irritated appearance near the introitus.  Scant whitish discharge is noted that is somewhat thick.  No other significant abnormality.

## 2021-06-18 NOTE — PATIENT INSTRUCTIONS - HE
Patient Instructions by Juan Carlos Corrigan MD at 4/27/2021 11:10 AM     Author: Juan Carlos Corrigan MD Service: -- Author Type: Physician    Filed: 4/27/2021 11:40 AM Encounter Date: 4/27/2021 Status: Signed    : Juan Carlos Corrigan MD (Physician)       It was a pleasure to meet with you today.      Below is a summary of your visit.   1. You can use the nicotine patches to help you quit smoking  2. As you quit smoking, decrease how much you eat and increase your activity to avoid weight gain.  3. Wear a heart rhythm monitor for up to 4 weeks to see what your heart is doing when you get symptoms of dizziness. If you have a couple typical episodes while wearing the monitor you can turn it in early.  4. Follow up with me in about 2 months, after the monitor if completed.    We will call you to inform you of your test or procedure results within 3 business days of the test being performed.  If you do not hear from our office with the test results within 1 week please do not hesitate to call asking for these results.     Please do not hesitate to call the [facility] clinic with any questions or concerns at [phone]. You can also reach my nurse, Stcaie, during normal business hours at [phone].    Sincerely,

## 2021-06-18 NOTE — PATIENT INSTRUCTIONS - HE
Patient Instructions by Juan Carlos Corrigan MD at 9/3/2020  9:20 AM     Author: Juan Carlos Corrigan MD Service: -- Author Type: Physician    Filed: 9/3/2020  9:53 AM Encounter Date: 9/3/2020 Status: Signed    : Juan Carlos Corrigan MD (Physician)       It was a pleasure to meet with you today.      Below is a summary of your visit.   1. Schedule an echocardiogram, or ultrasound of your heart to check its function with the new diagnosis of atrial fibrillation.  2. Increase your metoprolol to 50 mg twice daily. Until you start your new prescription you can take 2 tablets of what you already have twice daily  3. I extended your prescription of Eliquis to be refillable for a year. If this is too expensive we can meet to talk about alternative medications or a procedure.  4. You really need to quit smoking for many health reasons.  5. You should also limit alcohol intake to one or 2 drinks per day.  6. Follow up with me in about 3 months     You should receive a phone call from this office informing you of test or procedure results within 3 business days of the test being performed.  If you do not hear from our office with the test results within 1 week please do not hesitate to call asking for these results.     Please do not hesitate to call the [facility] clinic with any questions or concerns at [phone].    Sincerely,

## 2021-06-25 NOTE — TELEPHONE ENCOUNTER
RN cannot approve Refill Request    RN can NOT refill this medication medication not on med list. Last office visit: 3/8/2021 Jose Salamanca MD Last Physical: Visit date not found Last MTM visit: Visit date not found Last visit same specialty: 3/8/2021 Jose Salamanca MD.  Next visit within 3 mo: Visit date not found  Next physical within 3 mo: Visit date not found      Robson Baez, [facility] Connection Triage/Med Refill 5/26/2021    Requested Prescriptions   Pending Prescriptions Disp Refills     albuterol (PROAIR HFA;PROVENTIL HFA;VENTOLIN HFA) 90 mcg/actuation inhaler [Pharmacy Med Name: albuterol sulfate HFA 90 mcg/actuation aerosol inhaler] 8.5 g 3     Sig: USE 2 PUFFS EVERY 6 HOURS AS NEEDED FOR WHEEZING       Albuterol/Levalbuterol Refill Protocol Passed - 5/25/2021  8:00 AM        Passed - PCP or prescribing provider visit in last year     Last office visit with prescriber/PCP: 3/8/2021 Jose Salamanca MD OR same dept: 3/8/2021 Jose Salamanca MD OR same specialty: 3/8/2021 Jose Salamanca MD Last physical: Visit date not found       Next appt within 3 mo: Visit date not found  Next physical within 3 mo: Visit date not found  Prescriber OR PCP: Jose Salamanca MD  Last diagnosis associated with med order: There are no diagnoses linked to this encounter.  If protocol passes may refill for 6 months if within 3 months of last provider visit (or a total of 9 months). If patient requesting >1 inhaler per month refill x 6 months and have patient make appointment with provider.

## 2021-06-29 NOTE — PROGRESS NOTES
Progress Notes by Juan Carlos Corrigan MD at 9/3/2020  9:20 AM     Author: Juan Carlos Corrigan MD Service: -- Author Type: Physician    Filed: 9/3/2020 10:04 AM Encounter Date: 9/3/2020 Status: Signed    : Juan Carlos Corrigan MD (Physician)           Thank you, Jose Black MD, for asking the [facility] Care team to see Ms. Nicole Hopkins to evaluate Atrial Fibrillation.      Assessment/Recommendations   Assessment:    1. Persistent atrial fibrillation - new diagnosis - seems to be asymptomatic. On Eliquis for stroke prevention. Started on metoprolol for rate control, which is improved, but not yet optimal.   2. Hypertension - controlled  3. Tobacco abuse - advised smoking cessation  4. Alcohol overuse - advised further reduction in alcohol consumption.    Plan:  1. echocardiogram  2. Increase metoprolol tartrate to 50 mg two times a day   3. Continue eliquis (renewed for 1 year). If too expensive change to warfarin  4. Follow up in 3 months or sooner if needed.         History of Present Illness   Ms. Nicole Hokpins is a 65 y.o. female with a significant past history of obesity and hypertension who presents for evaluation of newly diagnosed atrial fibrillation.      was recently seen in the emergency department for intractable headache.  She was noted to have an irregular rhythm and was diagnosed with new onset atrial fibrillation.  She was not rapid ventricular response with a heart rate of approximately 120 bpm.  She was mostly asymptomatic from her atrial fib and denied any palpitations.  She does note that she has some shortness of breath with exertion and on climbing approximately 1 flight of stairs.  She was started on Eliquis for stroke prevention as well as metoprolol tartrate 25 mg twice daily for rate control.  Over the past 5 days since her ER visit she has noted a substantial increase in energy as well as less fatigue.  Her shortness of breath with exertion is also  improved.  She is also noticed that her headache had resolved.  She is feeling generally well today and denies any apneic episodes while sleeping.  Her  confirms that he is not concerned about sleep apnea which this couple is well aware of as the  has significant sleep apnea which is treated.      Other than noted above, Ms. Hopkins denies any chest pain/pressure/tightness, shortness of breath at rest or with exertion, light headedness/dizziness, pre-syncope, syncope, lower extremity swelling, palpitations, paroxysmal nocturnal dyspnea (PND), or orthopnea.     Cardiac Problems and Cardiac Diagnostics     Most Recent Cardiac testing:  ECG dated 8/30/2020 (personaly reviewed and interpreted): atrial fibrillation with RVR           Medications  Allergies   Current Outpatient Medications   Medication Sig Dispense Refill   ? albuterol (PROAIR HFA;PROVENTIL HFA;VENTOLIN HFA) 90 mcg/actuation inhaler INHALE 2 PUFFS BY MOUTH EVERY 6 HOURS AS NEEDED FOR WHEEZING 18 g 5   ? apixaban ANTICOAGULANT (ELIQUIS) 5 mg Tab tablet Take 1 tablet (5 mg total) by mouth 2 (two) times a day. 180 tablet 3   ? aspirin 81 MG EC tablet      ? cetirizine (ZYRTEC) 10 MG tablet Take 10 mg by mouth daily.     ? lisinopril-hydrochlorothiazide (PRINZIDE,ZESTORETIC) 20-25 mg per tablet TAKE 1 TABLET BY MOUTH ONCE DAILY 90 tablet 3   ? metoprolol tartrate (LOPRESSOR) 50 MG tablet Take 1 tablet (50 mg total) by mouth 2 (two) times a day. 180 tablet 3   ? miconazole (MONISTAT) 100 mg vaginal suppository Insert 100 mg into the vagina at bedtime.     ? fluconazole (DIFLUCAN) 150 MG tablet Take one tablet by mouth today, then another tablet in 7 days. 2 tablet 0   ? fluconazole (DIFLUCAN) 150 MG tablet 1 tablet daily every 3 days for 3 doses, then once weekly for 2 months 11 tablet 0   ? terconazole (TERAZOL 3) 0.8 % vaginal cream Insert one applicatorful vaginally for 3 nights 20 g 0   ? triamcinolone (KENALOG) 0.1 % cream Apply twice daily as  needed 30 g 0     No current facility-administered medications for this visit.       Allergies   Allergen Reactions   ? Penicillins         Physical Examination Review of Systems   Vitals:    09/03/20 0921   BP: 130/78   Pulse: (!) 106   Resp: 16   SpO2: 98%     Body mass index is 40.17 kg/m .  Wt Readings from Last 3 Encounters:   09/03/20 (!) 234 lb (106.1 kg)   08/30/20 (!) 237 lb (107.5 kg)   08/30/20 (!) 237 lb (107.5 kg)       General Appearance:   Pleasant female, appears older thanstated age. no acute distress, obese body habitus   ENT/Mouth: membranes moist, no apparent gingival bleeding.      EYES:  no scleral icterus, normal conjunctivae   Neck: no carotid bruits. supple   Respiratory:   lungs are clear to auscultation, no rales or wheezing, equal chest wall expansion    Cardiovascular:   Irregularly irregular rhythm, mildly tachycardic rate. Normal first and second heart sounds with no murmurs, rubs, or gallops; the carotid, radial and posterior tibial pulses are intact, Jugular venous pressure normal, no edema bilaterally    Abdomen/GI:  Soft, non-tender   Extremities: no cyanosis or clubbing   Skin: no xanthelasma, warm.    Heme/lymph/ Immunology No apparent bleeding noted.   Neurologic: Alert and oriented. normal gait, no tremors   Psychiatric: Pleasant, calm, appropriate affect.    A complete 10 system review of systems was performed and is negative except as mentioned in the HPI or below:  General: WNL  Eyes: WNL  Ears/Nose/Throat: WNL  Lungs: WNL  Heart: WNL  Stomach: WNL  Bladder: WNL  Muscle/Joints: WNL  Skin: WNL  Nervous System: WNL  Mental Health: WNL     Blood: WNL       Past History   Past Medical History:   Past Medical History:   Diagnosis Date   ? Atrial fibrillation (H)    ? Hypertension        Past Surgical History:   Past Surgical History:   Procedure Laterality Date   ? OR TOTAL ABDOM HYSTERECTOMY      Description: Total Abdominal Hysterectomy;  Recorded: 04/08/2011;       Family  History:   Family History   Problem Relation Age of Onset   ? Sleep apnea Mother    ? Colon cancer Mother    ? Atrial fibrillation Father         Social History:   Social History     Socioeconomic History   ? Marital status:      Spouse name: Not on file   ? Number of children: Not on file   ? Years of education: Not on file   ? Highest education level: Not on file   Occupational History   ? Not on file   Social Needs   ? Financial resource strain: Not on file   ? Food insecurity     Worry: Not on file     Inability: Not on file   ? Transportation needs     Medical: Not on file     Non-medical: Not on file   Tobacco Use   ? Smoking status: Current Every Day Smoker     Packs/day: 1.00   ? Smokeless tobacco: Never Used   Substance and Sexual Activity   ? Alcohol use: Yes     Alcohol/week: 3.0 standard drinks     Types: 3 Cans of beer per week     Frequency: 4 or more times a week     Drinks per session: 3 or 4   ? Drug use: Never   ? Sexual activity: Not on file   Lifestyle   ? Physical activity     Days per week: Not on file     Minutes per session: Not on file   ? Stress: Not on file   Relationships   ? Social connections     Talks on phone: Not on file     Gets together: Not on file     Attends L[employer] day service: Not on file     Active member of club or organization: Not on file     Attends meetings of clubs or organizations: Not on file     Relationship status: Not on file   ? Intimate partner violence     Fear of current or ex partner: Not on file     Emotionally abused: Not on file     Physically abused: Not on file     Forced sexual activity: Not on file   Other Topics Concern   ? Not on file   Social History Narrative   ? Not on file              Lab Results    Chemistry/lipid CBC Cardiac Enzymes/BNP/TSH/INR   Lab Results   Component Value Date    CHOL 155 09/26/2017    HDL 64 09/26/2017    LDLCALC 76 09/26/2017    TRIG 75 09/26/2017    CREATININE 0.75 08/30/2020    BUN 5 (L) 08/30/2020    K 3.9  08/30/2020     (L) 08/30/2020    CL 98 08/30/2020    CO2 23 08/30/2020    Lab Results   Component Value Date    WBC 8.4 08/30/2020    HGB 16.9 (H) 08/30/2020    HCT 47.9 (H) 08/30/2020     08/30/2020     08/30/2020    Lab Results   Component Value Date    TROPONINI <0.01 08/30/2020    TSH 2.59 08/30/2020

## 2021-06-30 NOTE — PROGRESS NOTES
Progress Notes by Juan Carlos Corrigan MD at 4/27/2021 11:10 AM     Author: Juan Carlos Corrigan MD Service: -- Author Type: Physician    Filed: 4/27/2021 11:50 AM Encounter Date: 4/27/2021 Status: Signed    : Juan Carlos Corrigan MD (Physician)           Thank you, Jose Black MD, for asking the [facility] team to see Ms. Nicole Hopkins to evaluate Follow-up.      Assessment/Recommendations   Assessment:    1. Persistent atrial fibrillation - now appears to be in sinus rhythm. On Eliquis for stroke prevention.  2. Pre-syncope - suspect arrhythmia related.   3. Hypertension - controlled  4. Tobacco abuse - advised smoking cessation - okay to use nicotine replacement as long as she is significantly reducing cigarette use.  5. Morbid obesity - uncontrolled. Discussed need for lifestyle modification, especially when quitting smoking to compensate for changes in metabolism.    Plan:  1. 4-week BENITO to correlate rhythm with symptoms of pre-syncope.  2. Continue medications without changes  3. Follow up in 2 months to discuss BENITO results.         History of Present Illness   Ms. Nicole Hopkins is a 66 y.o. female with a significant past history of obesity and hypertension who presents for follow-up.     returns for follow-up of her atrial fibrillation which was diagnosed in September, 2020. She reports having had 10 episodes of presyncope in the past 2 months.  Symptoms come on suddenly while seated and at rest, lasted a few minutes and then resolve spontaneously.  They have not occurred with activity.  She denies palpitations with these episodes.  She continues to smoke but is interested in quitting and plans to use the nicotine patches.  Weight has increased 25 pounds over the past 6 months.    Other than noted above, Ms. Hopkins denies any chest pain/pressure/tightness, shortness of breath at rest or with exertion, light headedness/dizziness, pre-syncope, syncope, lower extremity  swelling, palpitations, paroxysmal nocturnal dyspnea (PND), or orthopnea.     Cardiac Problems and Cardiac Diagnostics     Most Recent Cardiac testing:  ECG dated 8/30/2020 (personaly reviewed and interpreted): atrial fibrillation with RVR    Echocardiogram 9/11/2020    No previous study for comparison.    Normal left ventricular size, mild LVH, normal wall motion. Mildly elevated left ventricular filling pressure. Left ventricle ejection fraction is normal. The calculated left ventricular ejection fraction is 69%.    Normal right ventricular size and systolic function.    No obvious valvular disease.         Medications  Allergies   Current Outpatient Medications   Medication Sig Dispense Refill   ? apixaban ANTICOAGULANT (ELIQUIS) 5 mg Tab tablet Take 1 tablet (5 mg total) by mouth 2 (two) times a day. 180 tablet 3   ? aspirin 81 MG EC tablet      ? cetirizine (ZYRTEC) 10 MG tablet Take 10 mg by mouth daily.     ? lisinopriL-hydrochlorothiazide (PRINZIDE,ZESTORETIC) 20-25 mg per tablet Take 1 tablet by mouth once daily 90 tablet 3   ? metoprolol tartrate (LOPRESSOR) 50 MG tablet Take 1 tablet (50 mg total) by mouth 2 (two) times a day. 180 tablet 3   ? triamcinolone (KENALOG) 0.1 % cream Apply twice daily as needed 30 g 0   ? clotrimazole 2 % Crea Insert 1 applicator into the vagina daily. 21 g 4   ? estradioL (ESTRACE) 0.01 % (0.1 mg/gram) vaginal cream Insert 1 g into the vagina 3 (three) times a week. 42.5 g 1     No current facility-administered medications for this visit.       Allergies   Allergen Reactions   ? Penicillins         Physical Examination Review of Systems   Vitals:    04/27/21 1117   BP: 148/80   Pulse: 67   Resp: 14   SpO2: 98%     Body mass index is 44.29 kg/m .  Wt Readings from Last 3 Encounters:   04/27/21 (!) 258 lb (117 kg)   03/08/21 (!) 244 lb 8 oz (110.9 kg)   09/14/20 (!) 239 lb (108.4 kg)       General Appearance:   Pleasant female, appears older thanstated age. no acute distress,  obese body habitus   ENT/Mouth: membranes moist, no apparent gingival bleeding.      EYES:  no scleral icterus, normal conjunctivae   Neck: no carotid bruits. supple   Respiratory:   Reduced air entry. Scattered rhonchi noted.   Cardiovascular:   Normal rate, regular rhythm. Normal first and second heart sounds with no murmurs, rubs, or gallops; the carotid, radial and posterior tibial pulses are intact, Jugular venous pressure normal, trace edema bilaterally at ankles   Abdomen/GI:  Soft, non-tender   Extremities: no cyanosis or clubbing   Skin: no xanthelasma, warm.    Heme/lymph/ Immunology No apparent bleeding noted.   Neurologic: Alert and oriented. normal gait, no tremors   Psychiatric: Pleasant, calm, appropriate affect.    A complete 10 system review of systems was performed and is negative except as mentioned in the HPI or below:  General: WNL  Eyes: WNL  Ears/Nose/Throat: WNL  Lungs: Wheezing  Heart: Shortness of Breath with activity  Stomach: WNL  Bladder: WNL  Muscle/Joints: WNL  Skin: WNL  Nervous System: Dizziness  Mental Health: WNL     Blood: WNL       Past History   Past Medical History:   Past Medical History:   Diagnosis Date   ? Atrial fibrillation (H)    ? Hypertension        Past Surgical History:   Past Surgical History:   Procedure Laterality Date   ? ME TOTAL ABDOM HYSTERECTOMY      Description: Total Abdominal Hysterectomy;  Recorded: 04/08/2011;       Family History:   Family History   Problem Relation Age of Onset   ? Sleep apnea Mother    ? Colon cancer Mother    ? Atrial fibrillation Father         Social History:   Social History     Socioeconomic History   ? Marital status:      Spouse name: Not on file   ? Number of children: Not on file   ? Years of education: Not on file   ? Highest education level: Not on file   Occupational History   ? Not on file   Social Needs   ? Financial resource strain: Not on file   ? Food insecurity     Worry: Not on file     Inability: Not on file    ? Transportation needs     Medical: Not on file     Non-medical: Not on file   Tobacco Use   ? Smoking status: Current Every Day Smoker     Packs/day: 1.00   ? Smokeless tobacco: Never Used   Substance and Sexual Activity   ? Alcohol use: Yes     Alcohol/week: 3.0 standard drinks     Types: 3 Cans of beer per week     Frequency: 4 or more times a week     Drinks per session: 3 or 4   ? Drug use: Never   ? Sexual activity: Not on file   Lifestyle   ? Physical activity     Days per week: Not on file     Minutes per session: Not on file   ? Stress: Not on file   Relationships   ? Social connections     Talks on phone: Not on file     Gets together: Not on file     Attends [employer] service: Not on file     Active member of club or organization: Not on file     Attends meetings of clubs or organizations: Not on file     Relationship status: Not on file   ? Intimate partner violence     Fear of current or ex partner: Not on file     Emotionally abused: Not on file     Physically abused: Not on file     Forced sexual activity: Not on file   Other Topics Concern   ? Not on file   Social History Narrative   ? Not on file              Lab Results    Chemistry/lipid CBC Cardiac Enzymes/BNP/TSH/INR   Lab Results   Component Value Date    CHOL 155 09/14/2020    HDL 47 (L) 09/14/2020    LDLCALC 92 09/14/2020    TRIG 81 09/14/2020    CREATININE 0.76 03/08/2021    BUN 12 03/08/2021    K 4.2 03/08/2021     03/08/2021     03/08/2021    CO2 23 03/08/2021    Lab Results   Component Value Date    WBC 9.1 09/14/2020    HGB 16.5 (H) 09/14/2020    HCT 49.6 (H) 09/14/2020     (H) 09/14/2020     09/14/2020    Lab Results   Component Value Date    TROPONINI <0.01 08/30/2020    TSH 1.67 09/14/2020

## 2021-07-13 ENCOUNTER — RECORDS - HEALTHEAST (OUTPATIENT)
Dept: ADMINISTRATIVE | Facility: CLINIC | Age: 66
End: 2021-07-13

## 2021-07-21 ENCOUNTER — RECORDS - HEALTHEAST (OUTPATIENT)
Dept: ADMINISTRATIVE | Facility: CLINIC | Age: 66
End: 2021-07-21

## 2021-09-28 DIAGNOSIS — I48.19 PERSISTENT ATRIAL FIBRILLATION (H): Primary | ICD-10-CM

## 2021-09-28 RX ORDER — METOPROLOL TARTRATE 50 MG
50 TABLET ORAL
COMMUNITY
Start: 2020-09-08 | End: 2021-09-28

## 2021-09-28 RX ORDER — METOPROLOL TARTRATE 50 MG
50 TABLET ORAL 2 TIMES DAILY
Qty: 180 TABLET | Refills: 1 | Status: SHIPPED | OUTPATIENT
Start: 2021-09-28

## 2021-10-21 DIAGNOSIS — R06.2 WHEEZING: Primary | ICD-10-CM

## 2021-10-22 RX ORDER — ALBUTEROL SULFATE 90 UG/1
AEROSOL, METERED RESPIRATORY (INHALATION)
Qty: 8.5 G | Refills: 2 | Status: SHIPPED | OUTPATIENT
Start: 2021-10-22

## 2021-10-22 NOTE — TELEPHONE ENCOUNTER
" Disp Refills Start End PATSY   albuterol (PROAIR HFA;PROVENTIL HFA;VENTOLIN HFA) 90 mcg/actuation inhaler 8.5 g 3 5/26/2021  No   Sig: USE 2 PUFFS EVERY 6 HOURS AS NEEDED FOR WHEEZING   Sent to pharmacy as: albuterol sulfate HFA 90 mcg/actuation aerosol inhaler (PROAIR HFA;PROVENTIL HFA;VENTOLIN HFA)   Notes to Pharmacy: This prescription was filled on 5/10/2021. Any refills authorized will be placed on file.   E-Prescribing Status: Receipt confirmed by pharmacy (5/26/2021  9:56 AM CDT)     Last Written Prescription Date:  05/26/2021  Last Fill Quantity: 8.5 g,  # refills: 3   Last office visit provider:   03/08/2021 with Dr Salamanca.    Requested Prescriptions   Pending Prescriptions Disp Refills     albuterol (PROAIR HFA/PROVENTIL HFA/VENTOLIN HFA) 108 (90 Base) MCG/ACT inhaler [Pharmacy Med Name: albuterol sulfate HFA 90 mcg/actuation aerosol inhaler] 8.5 g 3     Sig: USE 2 PUFFS EVERY 6 HOURS AS NEEDED FOR WHEEZING       Asthma Maintenance Inhalers - Anticholinergics Passed - 10/21/2021  9:46 AM        Passed - Patient is age 12 years or older        Passed - Recent (12 mo) or future (30 days) visit within the authorizing provider's specialty     Patient has had an office visit with the authorizing provider or a provider within the authorizing providers department within the previous 12 mos or has a future within next 30 days. See \"Patient Info\" tab in inbasket, or \"Choose Columns\" in Meds & Orders section of the refill encounter.              Passed - Medication is active on med list       Short-Acting Beta Agonist Inhalers Protocol  Passed - 10/21/2021  9:46 AM        Passed - Patient is age 12 or older        Passed - Recent (12 mo) or future (30 days) visit within the authorizing provider's specialty     Patient has had an office visit with the authorizing provider or a provider within the authorizing providers department within the previous 12 mos or has a future within next 30 days. See \"Patient Info\" tab in " "inbasket, or \"Choose Columns\" in Meds & Orders section of the refill encounter.              Passed - Medication is active on med list             Adina Mcdermott 10/22/21 2:31 AM  "

## 2021-10-27 DIAGNOSIS — I10 ESSENTIAL HYPERTENSION, BENIGN: Primary | ICD-10-CM

## 2021-10-28 RX ORDER — LISINOPRIL AND HYDROCHLOROTHIAZIDE 20; 25 MG/1; MG/1
TABLET ORAL
Qty: 90 TABLET | Refills: 2 | Status: SHIPPED | OUTPATIENT
Start: 2021-10-28 | End: 2022-07-18

## 2021-10-28 NOTE — TELEPHONE ENCOUNTER
"  Disp Refills Start End PATSY    lisinopriL-hydrochlorothiazide (PRINZIDE,ZESTORETIC) 20-25 mg per tablet 90 tablet 3 10/7/2020  No   Sig: Take 1 tablet by mouth once daily   Sent to pharmacy as: lisinopril 20 mg-hydrochlorothiazide 25 mg tablet (PRINZIDE,ZESTORETIC)   E-Prescribing Status: Receipt confirmed by pharmacy (10/7/2020 12:38 PM CDT)       lisinopriL-hydrochlorothiazide (PRINZIDE,ZESTORETIC) 20-25 mg per tablet [[id number]]    Electronically signed by: Tuyet Macias RN on 10/07/20 1238 Status: Active   Ordering user: Tuyet Macias RN 10/07/20 1238 Ordering provider: Jose Salamanca MD   Authorized by: Jose Salamanca MD   Frequency:  10/07/20 - Until Discontinued Released by: Tuyet Macias RN 10/07/20 1238   Diagnoses  Essential hypertension, benign [I10]     Routing refill request to provider for review/approval because:  Labs out of range:  BP    Last office visit provider:  3/8/21     Requested Prescriptions   Pending Prescriptions Disp Refills     lisinopril-hydrochlorothiazide (ZESTORETIC) 20-25 MG tablet [Pharmacy Med Name: lisinopril 20 mg-hydrochlorothiazide 25 mg tablet] 90 tablet 2     Sig: TAKE ONE TABLET ONCE DAILY (15.00 COPAY)       Diuretics (Including Combos) Protocol Failed - 10/27/2021  1:24 PM        Failed - Blood pressure under 140/90 in past 12 months     BP Readings from Last 3 Encounters:   04/27/21 (!) 148/80   03/08/21 130/76   09/14/20 136/76                 Passed - Recent (12 mo) or future (30 days) visit within the authorizing provider's specialty     Patient has had an office visit with the authorizing provider or a provider within the authorizing providers department within the previous 12 mos or has a future within next 30 days. See \"Patient Info\" tab in inbasket, or \"Choose Columns\" in Meds & Orders section of the refill encounter.              Passed - Medication is active on med list        Passed - Patient is age 18 or older        Passed - No active pregancy " "on record        Passed - Normal serum creatinine on file in past 12 months     Recent Labs   Lab Test 03/08/21  1246   CR 0.76              Passed - Normal serum potassium on file in past 12 months     Recent Labs   Lab Test 03/08/21  1246   POTASSIUM 4.2                    Passed - Normal serum sodium on file in past 12 months     Recent Labs   Lab Test 03/08/21  1246                 Passed - No positive pregnancy test in past 12 months       ACE Inhibitors (Including Combos) Protocol Failed - 10/27/2021  1:24 PM        Failed - Blood pressure under 140/90 in past 12 months     BP Readings from Last 3 Encounters:   04/27/21 (!) 148/80   03/08/21 130/76   09/14/20 136/76                 Passed - Recent (12 mo) or future (30 days) visit within the authorizing provider's specialty     Patient has had an office visit with the authorizing provider or a provider within the authorizing providers department within the previous 12 mos or has a future within next 30 days. See \"Patient Info\" tab in inbasket, or \"Choose Columns\" in Meds & Orders section of the refill encounter.              Passed - Medication is active on med list        Passed - Patient is age 18 or older        Passed - No active pregnancy on record        Passed - Normal serum creatinine on file in past 12 months     Recent Labs   Lab Test 03/08/21  1246   CR 0.76       Ok to refill medication if creatinine is low          Passed - Normal serum potassium on file in past 12 months     Recent Labs   Lab Test 03/08/21  1246   POTASSIUM 4.2             Passed - No positive pregnancy test within past 12 months             Robson Baez RN 10/28/21 1:30 PM  "

## 2022-01-09 DIAGNOSIS — B37.31 YEAST INFECTION OF THE VAGINA: Primary | ICD-10-CM

## 2022-07-18 DIAGNOSIS — I10 ESSENTIAL HYPERTENSION, BENIGN: ICD-10-CM

## 2022-07-18 RX ORDER — LISINOPRIL AND HYDROCHLOROTHIAZIDE 20; 25 MG/1; MG/1
TABLET ORAL
Qty: 90 TABLET | Refills: 2 | Status: SHIPPED | OUTPATIENT
Start: 2022-07-18

## 2022-07-18 NOTE — TELEPHONE ENCOUNTER
"Routing refill request to provider for review/approval because:  Labs out of range:  Last CR 0.76 on 3/8/2021, K 4.2 on 3/8/2021,  on 3/8/21, BP high 148/80 on 4/27/2021.    Last Written Prescription Date:  10/28/2021   Last Fill Quantity: 90,  # refills: 2   Last office visit provider:  3/25/22     Requested Prescriptions   Pending Prescriptions Disp Refills     lisinopril-hydrochlorothiazide (ZESTORETIC) 20-25 MG tablet [Pharmacy Med Name: lisinopril 20 mg-hydrochlorothiazide 25 mg tablet] 90 tablet 2     Sig: TAKE ONE TABLET BY MOUTH EVERY DAY       Diuretics (Including Combos) Protocol Failed - 7/18/2022  8:02 AM        Failed - Blood pressure under 140/90 in past 12 months     BP Readings from Last 3 Encounters:   04/27/21 (!) 148/80   03/08/21 130/76   09/14/20 136/76                 Failed - Recent (12 mo) or future (30 days) visit within the authorizing provider's specialty     Patient has had an office visit with the authorizing provider or a provider within the authorizing providers department within the previous 12 mos or has a future within next 30 days. See \"Patient Info\" tab in inbasket, or \"Choose Columns\" in Meds & Orders section of the refill encounter.              Failed - Normal serum creatinine on file in past 12 months     Recent Labs   Lab Test 03/08/21  1246   CR 0.76              Failed - Normal serum potassium on file in past 12 months     Recent Labs   Lab Test 03/08/21  1246   POTASSIUM 4.2                    Failed - Normal serum sodium on file in past 12 months     Recent Labs   Lab Test 03/08/21  1246                 Passed - Medication is active on med list        Passed - Patient is age 18 or older        Passed - No active pregancy on record        Passed - No positive pregnancy test in past 12 months       ACE Inhibitors (Including Combos) Protocol Failed - 7/18/2022  8:02 AM        Failed - Blood pressure under 140/90 in past 12 months     BP Readings from Last 3 " "Encounters:   04/27/21 (!) 148/80   03/08/21 130/76   09/14/20 136/76                 Failed - Recent (12 mo) or future (30 days) visit within the authorizing provider's specialty     Patient has had an office visit with the authorizing provider or a provider within the authorizing providers department within the previous 12 mos or has a future within next 30 days. See \"Patient Info\" tab in inbasket, or \"Choose Columns\" in Meds & Orders section of the refill encounter.              Failed - Normal serum creatinine on file in past 12 months     Recent Labs   Lab Test 03/08/21  1246   CR 0.76       Ok to refill medication if creatinine is low          Failed - Normal serum potassium on file in past 12 months     Recent Labs   Lab Test 03/08/21  1246   POTASSIUM 4.2             Passed - Medication is active on med list        Passed - Patient is age 18 or older        Passed - No active pregnancy on record        Passed - No positive pregnancy test within past 12 months             Augustina Reinoso RN 07/18/22 10:26 AM  "